# Patient Record
Sex: FEMALE | Race: WHITE | NOT HISPANIC OR LATINO | Employment: STUDENT | URBAN - METROPOLITAN AREA
[De-identification: names, ages, dates, MRNs, and addresses within clinical notes are randomized per-mention and may not be internally consistent; named-entity substitution may affect disease eponyms.]

---

## 2018-05-17 ENCOUNTER — RECORDS - HEALTHEAST (OUTPATIENT)
Dept: LAB | Facility: CLINIC | Age: 18
End: 2018-05-17

## 2018-05-18 LAB
25(OH)D3 SERPL-MCNC: 48.3 NG/ML (ref 30–80)
C TRACH DNA SPEC QL PROBE+SIG AMP: NEGATIVE
N GONORRHOEA DNA SPEC QL NAA+PROBE: NEGATIVE

## 2019-04-01 ENCOUNTER — TRANSFERRED RECORDS (OUTPATIENT)
Dept: HEALTH INFORMATION MANAGEMENT | Facility: CLINIC | Age: 19
End: 2019-04-01

## 2019-04-02 ENCOUNTER — TELEPHONE (OUTPATIENT)
Dept: ONCOLOGY | Facility: CLINIC | Age: 19
End: 2019-04-02

## 2019-04-02 DIAGNOSIS — D69.6 THROMBOCYTOPENIA (H): ICD-10-CM

## 2019-04-02 DIAGNOSIS — R23.3 EASY BRUISING: ICD-10-CM

## 2019-04-02 NOTE — TELEPHONE ENCOUNTER
ONCOLOGY INTAKE: Records Information      APPT INFORMATION: 5/1/19 at 5:00PM  Referring provider:  Dr. Franny Beaver  Referring provider s clinic:  [facility]  Reason for visit/diagnosis:  Low Platelets, Abnormal White Cell Count    RECORDS INFORMATION:  Were the records received with the referral (via [employer])? No    Has patient been seen for any external appt for this diagnosis? Yes    If yes, where? Only at [facility]    Has patient had any imaging or procedures outside of [facility] for this condition? No    ADDITIONAL INFORMATION:  4/2 - ARTUR for Dr. Beaver's clinic emailed to the pt. She will print, sign/date, and either email or fax back to oncology intake.

## 2019-04-02 NOTE — TELEPHONE ENCOUNTER
4/2 - Signed ARTUR received from pt for [facility] clinic. This has been saved to the R drive.

## 2019-04-05 ENCOUNTER — TRANSFERRED RECORDS (OUTPATIENT)
Dept: HEALTH INFORMATION MANAGEMENT | Facility: CLINIC | Age: 19
End: 2019-04-05

## 2019-04-08 ENCOUNTER — RECORDS - HEALTHEAST (OUTPATIENT)
Dept: LAB | Facility: CLINIC | Age: 19
End: 2019-04-08

## 2019-04-08 LAB — C REACTIVE PROTEIN LHE: 0.7 MG/DL (ref 0–0.8)

## 2019-04-10 ENCOUNTER — MEDICAL CORRESPONDENCE (OUTPATIENT)
Dept: HEALTH INFORMATION MANAGEMENT | Facility: CLINIC | Age: 19
End: 2019-04-10

## 2019-04-10 NOTE — TELEPHONE ENCOUNTER
RECORDS STATUS - ALL OTHER DIAGNOSIS      RECORDS RECEIVED FROM: [facility]   DATE RECEIVED: 4/26/19   NOTES STATUS DETAILS   OFFICE NOTE from referring provider Tangela Beaver   OFFICE NOTE from medical oncologist     DISCHARGE SUMMARY from hospital     DISCHARGE REPORT from the ER     OPERATIVE REPORT     MEDICATION LIST     CLINICAL TRIAL TREATMENTS TO DATE     LABS     PATHOLOGY REPORTS     ANYTHING RELATED TO DIAGNOSIS 4/1/19 Epic   GENONOMIC TESTING     TYPE:     IMAGING (NEED IMAGES & REPORT)     CT SCANS     MRI     MAMMO     ULTRASOUND     PET

## 2019-05-01 ENCOUNTER — PRE VISIT (OUTPATIENT)
Dept: ONCOLOGY | Facility: CLINIC | Age: 19
End: 2019-05-01

## 2019-05-01 ENCOUNTER — ONCOLOGY VISIT (OUTPATIENT)
Dept: ONCOLOGY | Facility: CLINIC | Age: 19
End: 2019-05-01
Attending: INTERNAL MEDICINE
Payer: COMMERCIAL

## 2019-05-01 VITALS
HEART RATE: 78 BPM | HEIGHT: 65 IN | TEMPERATURE: 97.9 F | WEIGHT: 149.5 LBS | SYSTOLIC BLOOD PRESSURE: 110 MMHG | OXYGEN SATURATION: 97 % | DIASTOLIC BLOOD PRESSURE: 68 MMHG | BODY MASS INDEX: 24.91 KG/M2

## 2019-05-01 DIAGNOSIS — D69.6 THROMBOCYTOPENIA (H): Primary | ICD-10-CM

## 2019-05-01 DIAGNOSIS — R79.0 LOW FERRITIN: ICD-10-CM

## 2019-05-01 DIAGNOSIS — D69.6 THROMBOCYTOPENIA (H): ICD-10-CM

## 2019-05-01 DIAGNOSIS — R23.3 EASY BRUISING: ICD-10-CM

## 2019-05-01 LAB
ABO + RH BLD: NORMAL
ABO + RH BLD: NORMAL
ALBUMIN SERPL-MCNC: 3.9 G/DL (ref 3.4–5)
ALP SERPL-CCNC: 63 U/L (ref 40–150)
ALT SERPL W P-5'-P-CCNC: 20 U/L (ref 0–50)
ANION GAP SERPL CALCULATED.3IONS-SCNC: 8 MMOL/L (ref 3–14)
APTT PPP: 29 SEC (ref 22–37)
AST SERPL W P-5'-P-CCNC: 23 U/L (ref 0–45)
BASOPHILS # BLD AUTO: 0.1 10E9/L (ref 0–0.2)
BASOPHILS NFR BLD AUTO: 1.1 %
BILIRUB SERPL-MCNC: 0.5 MG/DL (ref 0.2–1.3)
BUN SERPL-MCNC: 6 MG/DL (ref 7–30)
CALCIUM SERPL-MCNC: 9.1 MG/DL (ref 8.5–10.1)
CHLORIDE SERPL-SCNC: 107 MMOL/L (ref 94–109)
CO2 SERPL-SCNC: 24 MMOL/L (ref 20–32)
CREAT SERPL-MCNC: 0.61 MG/DL (ref 0.52–1.04)
DIFFERENTIAL METHOD BLD: ABNORMAL
EOSINOPHIL # BLD AUTO: 0.4 10E9/L (ref 0–0.7)
EOSINOPHIL NFR BLD AUTO: 5.8 %
ERYTHROCYTE [DISTWIDTH] IN BLOOD BY AUTOMATED COUNT: 12.2 % (ref 10–15)
ERYTHROCYTE [SEDIMENTATION RATE] IN BLOOD BY WESTERGREN METHOD: 7 MM/H (ref 0–20)
FERRITIN SERPL-MCNC: 9 NG/ML (ref 12–150)
GFR SERPL CREATININE-BSD FRML MDRD: >90 ML/MIN/{1.73_M2}
GLUCOSE SERPL-MCNC: 85 MG/DL (ref 70–99)
HCT VFR BLD AUTO: 39.7 % (ref 35–47)
HGB BLD-MCNC: 13.1 G/DL (ref 11.7–15.7)
IMM GRANULOCYTES # BLD: 0 10E9/L (ref 0–0.4)
IMM GRANULOCYTES NFR BLD: 0.2 %
INR PPP: 0.97 (ref 0.86–1.14)
IRON SATN MFR SERPL: 6 % (ref 15–46)
IRON SERPL-MCNC: 32 UG/DL (ref 35–180)
LDH SERPL L TO P-CCNC: 204 U/L (ref 81–234)
LYMPHOCYTES # BLD AUTO: 3 10E9/L (ref 0.8–5.3)
LYMPHOCYTES NFR BLD AUTO: 48.3 %
MCH RBC QN AUTO: 29.6 PG (ref 26.5–33)
MCHC RBC AUTO-ENTMCNC: 33 G/DL (ref 31.5–36.5)
MCV RBC AUTO: 90 FL (ref 78–100)
MONOCYTES # BLD AUTO: 0.4 10E9/L (ref 0–1.3)
MONOCYTES NFR BLD AUTO: 7.1 %
NEUTROPHILS # BLD AUTO: 2.3 10E9/L (ref 1.6–8.3)
NEUTROPHILS NFR BLD AUTO: 37.5 %
NRBC # BLD AUTO: 0 10*3/UL
NRBC BLD AUTO-RTO: 0 /100
PLATELET # BLD AUTO: 120 10E9/L (ref 150–450)
POTASSIUM SERPL-SCNC: 3.6 MMOL/L (ref 3.4–5.3)
PROT SERPL-MCNC: 8 G/DL (ref 6.8–8.8)
RBC # BLD AUTO: 4.42 10E12/L (ref 3.8–5.2)
RETICS # AUTO: 66.7 10E9/L (ref 25–95)
RETICS/RBC NFR AUTO: 1.5 % (ref 0.5–2)
SODIUM SERPL-SCNC: 138 MMOL/L (ref 133–144)
SPECIMEN EXP DATE BLD: NORMAL
TIBC SERPL-MCNC: 506 UG/DL (ref 240–430)
WBC # BLD AUTO: 6.2 10E9/L (ref 4–11)

## 2019-05-01 PROCEDURE — 86704 HEP B CORE ANTIBODY TOTAL: CPT | Performed by: INTERNAL MEDICINE

## 2019-05-01 PROCEDURE — 86803 HEPATITIS C AB TEST: CPT | Performed by: INTERNAL MEDICINE

## 2019-05-01 PROCEDURE — 86900 BLOOD TYPING SEROLOGIC ABO: CPT | Performed by: INTERNAL MEDICINE

## 2019-05-01 PROCEDURE — 86901 BLOOD TYPING SEROLOGIC RH(D): CPT | Performed by: INTERNAL MEDICINE

## 2019-05-01 PROCEDURE — 85240 CLOT FACTOR VIII AHG 1 STAGE: CPT | Performed by: INTERNAL MEDICINE

## 2019-05-01 PROCEDURE — 85730 THROMBOPLASTIN TIME PARTIAL: CPT | Performed by: INTERNAL MEDICINE

## 2019-05-01 PROCEDURE — 82784 ASSAY IGA/IGD/IGG/IGM EACH: CPT | Performed by: INTERNAL MEDICINE

## 2019-05-01 PROCEDURE — 86431 RHEUMATOID FACTOR QUANT: CPT | Performed by: INTERNAL MEDICINE

## 2019-05-01 PROCEDURE — 85245 CLOT FACTOR VIII VW RISTOCTN: CPT | Performed by: INTERNAL MEDICINE

## 2019-05-01 PROCEDURE — 86146 BETA-2 GLYCOPROTEIN ANTIBODY: CPT | Performed by: INTERNAL MEDICINE

## 2019-05-01 PROCEDURE — 86644 CMV ANTIBODY: CPT | Performed by: INTERNAL MEDICINE

## 2019-05-01 PROCEDURE — 85025 COMPLETE CBC W/AUTO DIFF WBC: CPT | Performed by: INTERNAL MEDICINE

## 2019-05-01 PROCEDURE — 99214 OFFICE O/P EST MOD 30 MIN: CPT | Mod: ZP | Performed by: INTERNAL MEDICINE

## 2019-05-01 PROCEDURE — 80053 COMPREHEN METABOLIC PANEL: CPT | Performed by: INTERNAL MEDICINE

## 2019-05-01 PROCEDURE — 36415 COLL VENOUS BLD VENIPUNCTURE: CPT | Performed by: INTERNAL MEDICINE

## 2019-05-01 PROCEDURE — 86747 PARVOVIRUS ANTIBODY: CPT | Mod: 91 | Performed by: INTERNAL MEDICINE

## 2019-05-01 PROCEDURE — 83876 ASSAY MYELOPEROXIDASE: CPT | Performed by: INTERNAL MEDICINE

## 2019-05-01 PROCEDURE — G0463 HOSPITAL OUTPT CLINIC VISIT: HCPCS | Mod: ZF

## 2019-05-01 PROCEDURE — 87340 HEPATITIS B SURFACE AG IA: CPT | Performed by: INTERNAL MEDICINE

## 2019-05-01 PROCEDURE — 83540 ASSAY OF IRON: CPT | Performed by: INTERNAL MEDICINE

## 2019-05-01 PROCEDURE — 83615 LACTATE (LD) (LDH) ENZYME: CPT | Performed by: INTERNAL MEDICINE

## 2019-05-01 PROCEDURE — 85247 CLOT FACTOR VIII MULTIMETRIC: CPT | Performed by: INTERNAL MEDICINE

## 2019-05-01 PROCEDURE — 85045 AUTOMATED RETICULOCYTE COUNT: CPT | Performed by: INTERNAL MEDICINE

## 2019-05-01 PROCEDURE — 87389 HIV-1 AG W/HIV-1&-2 AB AG IA: CPT | Performed by: INTERNAL MEDICINE

## 2019-05-01 PROCEDURE — 40000611 ZZHCL STATISTIC MORPHOLOGY W/INTERP HEMEPATH TC 85060: Performed by: INTERNAL MEDICINE

## 2019-05-01 PROCEDURE — 85610 PROTHROMBIN TIME: CPT | Performed by: INTERNAL MEDICINE

## 2019-05-01 PROCEDURE — 83516 IMMUNOASSAY NONANTIBODY: CPT | Performed by: INTERNAL MEDICINE

## 2019-05-01 PROCEDURE — 86747 PARVOVIRUS ANTIBODY: CPT | Performed by: INTERNAL MEDICINE

## 2019-05-01 PROCEDURE — 82728 ASSAY OF FERRITIN: CPT | Performed by: INTERNAL MEDICINE

## 2019-05-01 PROCEDURE — 00000401 ZZHCL STATISTIC THROMBIN TIME NC: Performed by: INTERNAL MEDICINE

## 2019-05-01 PROCEDURE — 85652 RBC SED RATE AUTOMATED: CPT | Performed by: INTERNAL MEDICINE

## 2019-05-01 PROCEDURE — 87799 DETECT AGENT NOS DNA QUANT: CPT | Performed by: INTERNAL MEDICINE

## 2019-05-01 PROCEDURE — 86147 CARDIOLIPIN ANTIBODY EA IG: CPT | Performed by: INTERNAL MEDICINE

## 2019-05-01 PROCEDURE — 85576 BLOOD PLATELET AGGREGATION: CPT | Performed by: INTERNAL MEDICINE

## 2019-05-01 PROCEDURE — 86706 HEP B SURFACE ANTIBODY: CPT | Performed by: INTERNAL MEDICINE

## 2019-05-01 PROCEDURE — 83550 IRON BINDING TEST: CPT | Performed by: INTERNAL MEDICINE

## 2019-05-01 PROCEDURE — 00000447 ZZHCL STATISTIC VON WILLEBRAND MULTIMERS: Performed by: INTERNAL MEDICINE

## 2019-05-01 PROCEDURE — 85613 RUSSELL VIPER VENOM DILUTED: CPT | Performed by: INTERNAL MEDICINE

## 2019-05-01 PROCEDURE — 83520 IMMUNOASSAY QUANT NOS NONAB: CPT | Performed by: INTERNAL MEDICINE

## 2019-05-01 PROCEDURE — 85246 CLOT FACTOR VIII VW ANTIGEN: CPT | Performed by: INTERNAL MEDICINE

## 2019-05-01 RX ORDER — DROSPIRENONE AND ETHINYL ESTRADIOL 0.02-3(28)
1 KIT ORAL DAILY
Refills: 1 | COMMUNITY
Start: 2019-04-06 | End: 2020-09-23

## 2019-05-01 RX ORDER — CETIRIZINE HYDROCHLORIDE 10 MG/1
10 TABLET ORAL 2 TIMES DAILY
COMMUNITY
End: 2020-06-04

## 2019-05-01 RX ORDER — ESCITALOPRAM OXALATE 10 MG/1
10 TABLET ORAL DAILY
Refills: 1 | COMMUNITY
Start: 2019-01-10 | End: 2020-06-04

## 2019-05-01 ASSESSMENT — PAIN SCALES - GENERAL: PAINLEVEL: NO PAIN (0)

## 2019-05-01 ASSESSMENT — MIFFLIN-ST. JEOR: SCORE: 1447.75

## 2019-05-01 NOTE — PROGRESS NOTES
[facility] Hematology Consultation    Outpatient Visit Note:    Patient: Telma Freed  MRN: [id number]  : 2000  FAUZIA: May 1, 2019    Reason for Consultation:  Telma Freed is a referred by Franny Beaver MD for evaluation and treatment of thrombocytopenia.    Assessment:  In summary, Telma Freed is a 18 year old woman with no significant past medical history and recent diagnosis of mononucleosis who presents with thrombocytopenia since  and personal history of easy bruising.     1). Chronic thrombocytopenia  2). Easy bruising  3). Recent mononucleosis  4). Low serum ferritin    Ms. Freed recently had mononucleosis which lead to platelet levels <40K. It is interesting that when she was on steroids this value did not increase. She has a personal history of easy bruising and had an ISTH bleeding assessment score of 5 in clinic today (Clara BUSTOS et al. 2014 JTH), in female patients a score of <6 adult female is considered normal. The biggest caveat is that this score is weighed toward VWD not platelet disorders AND patient has not gone through extreme stressors (childbirth, major surgery). Her mother also has history of easy bruising and heavy menstrual bleeding- raising concern for an inheirted disorder. Therefore, I will send off VWF screening. Her PFA screen was negative. Therefore, I will wait on ordering platelet aggregometry.     Her peripheral blood has normal platelet morphology, making macrothrombocytopenia less likely.         Plan:  1. Majority of today's visit was spent counseling the patient regarding thrombocytopenia.  2.   Orders Placed This Encounter   Procedures     Erythrocyte sedimentation rate auto     Ferritin     INR     Iron and iron binding capacity     Lactate Dehydrogenase     CBC with platelets differential     Factor 8 assay     Von Willebrand antigen     VWF Activity with reflex to Ristocetin Cofactor Activity     Von Willebrand  Multimers     von Willebrand Interpretation     Blood Morphology Pathologist Review     Reticulocyte Count     Rheumatoid factor     CMV Antibody IgG     Parvovirus B19 antibodies IgG IgM     Comprehensive metabolic panel     Tryptase     Ristocetin cofactor     Beta 2 Glycoprotein 1 Antibody IgG     Beta 2 Glycoprotein 1 Antibody IgM     Cardiolipin Kassie IgG and IgM     Lupus Anticoagulant Panel     Partial thromboplastin time     EBV DNA PCR Quantitative Whole Blood     Vasculitis panel     HIV Antigen Antibody Combo     Immunoglobulins A G and M     Hepatitis C antibody     Hepatitis B core antibody     Hepatitis B Surface Antibody     Hepatitis B surface antigen     Platelet function closure with reflex     ABO and Rh         The patient is given our center's contact information and is instructed to call if she should have any further questions or concerns.  Otherwise, we will plan on seeing her back Follow up with Provider - 3 months .        Yin Chin MD/PhD   of Medicine  [facility]   ----------------------------------------------------------------------------------------------------------------------    History of Present Illness:  Telma Freed is a 18 year old woman who presents in consultation due to chronic thrombocytopenia (platelets ~120) with recent worsening due to mononucleosis and lifelong history of easy bruising.     Telma reports that she is a easy bruiser her whole life. She report having large bruises on her arms with minor or no trauma. About 1-2 a year she get large, deep bruises after trauma. She is currently on oral contraceptives. She reports that these have decreased her periods from 7 days to 5 days. She has to change a super tampon every 2 hours due to it being soaked. She also has to double up wearing a pad and tampon at night. She also endorses having nose bleeds several times a month. They last 5-10 min. She has never  "been to ER for nosebleeds. She has had teeth removed and her adenoids as a young teen. She does not recall having severe bleeding during these surgeries.     Telma denies craving ice. She does not eat red meat, but otherwise eats other animal protein. She has not noted gum bleeding. She has not had any episodes of hemoptysis, but does report a \"copper taste\" in the back of her throat from time to time. She recent had mono. This was diagnosed after she had severe sore throat. She was placed on steroids that did not help. She eventually was placed on antibiotics. During this time her platelet count fell as low as 38.      Telma reports that she currently has hives over her arms and legs. This started after lying outside in grass. She also endorses history of flushing after running. She denies having fevers, night sweats or chills.     Past Medical History:  Larry barr mono    Past Surgical History:  Adenoids    Medications:  Current Outpatient Medications   Medication Sig Dispense Refill     cetirizine (ZYRTEC ALLERGY) 10 MG tablet Take 10 mg by mouth 2 times daily       diphenhydrAMINE (BENADRYL) 2 % external cream Apply topically 3 times daily as needed for itching       diphenhydrAMINE HCl (BENADRYL ALLERGY PO)        NEW MED           Allergies:  Allergies   Allergen Reactions     Seasonal Allergies Hives       ROS:  A 14 point ROS is negative except as stated in the HPI    Social History:  Denies any tobacco use. Drinks alcohol on weekends. Abstained during her acute illness with mono. Denies any illicit drug use. 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    Family History:  Mother with easy bruising, heavy bleeding   Younger brother with no bleeding history per patient  Maternal grandmother  Maternal grandfather  Maternal aunt    Patient is not aware of her father's family history  Objective:  Vitals: /68 (BP Location: Right arm, Patient Position: " "Sitting, Cuff Size: Adult Regular)   Pulse 78   Temp 97.9  F (36.6  C) (Oral)   Ht 1.649 m (5' 4.92\")   Wt 67.8 kg (149 lb 8 oz)   SpO2 97%   BMI 24.94 kg/m    Exam:   Constitutional: Appears well, no distress  HEENT: Pupils equal and reactive to light. No scleral icterus or hemorrhage. Nares without evidence of telangiectasia. Mucous membranes moist with no wet purpura. Dentition overall ok with no signs of decay. No pharyngeal exudates. No lymphadenopathy, no thyromeagaly  CV: regular rate and rhythm, no murmurs  Respiratory: clear  GI: abdomen soft, nontender, without guarding or rebound. No hepatomeagaly. No splenomegaly. Rectal exam deferred.   Mus/Skele: no edema  Skin: no petechiae, no ecchymosis.bilateral arms with small (~2 mm) punctate raised lesions on extensor surface. + dermatographia.  Neuro: CN II-XII intact. Normal gait. AOx3  Heme/Lymph: no supraclavicular, axillary or umbilical adenopathy.     Labs: personally reviewed with relevant trends annotated below  Results for orders placed or performed in visit on 05/01/19 (from the past 48 hour(s))   INR   Result Value Ref Range    INR 0.97 0.86 - 1.14   CMV Antibody IgG   Result Value Ref Range    CMV Antibody IgG <0.2 0.0 - 0.8 AI   Comprehensive metabolic panel   Result Value Ref Range    Sodium 138 133 - 144 mmol/L    Potassium 3.6 3.4 - 5.3 mmol/L    Chloride 107 94 - 109 mmol/L    Carbon Dioxide 24 20 - 32 mmol/L    Anion Gap 8 3 - 14 mmol/L    Glucose 85 70 - 99 mg/dL    Urea Nitrogen 6 (L) 7 - 30 mg/dL    Creatinine 0.61 0.52 - 1.04 mg/dL    GFR Estimate >90 >60 mL/min/[1.73_m2]    GFR Estimate If Black >90 >60 mL/min/[1.73_m2]    Calcium 9.1 8.5 - 10.1 mg/dL    Bilirubin Total 0.5 0.2 - 1.3 mg/dL    Albumin 3.9 3.4 - 5.0 g/dL    Protein Total 8.0 6.8 - 8.8 g/dL    Alkaline Phosphatase 63 40 - 150 U/L    ALT 20 0 - 50 U/L    AST 23 0 - 45 U/L   ABO and Rh   Result Value Ref Range    ABO A     RH(D) Neg     Specimen Expires 05/04/2019  "   Cardiolipin Kassie IgG and IgM   Result Value Ref Range    Cardiolipin Antibody IgG <1.6 0.0 - 19.9 GPL-U/mL    Cardiolipin Antibody IgM 0.5 0.0 - 19.9 MPL-U/mL   Partial thromboplastin time   Result Value Ref Range    PTT 29 22 - 37 sec       Imaging:  none

## 2019-05-01 NOTE — PATIENT INSTRUCTIONS
We are checking a number of labs to check for low platelet counts.   This includes evaluation for von Willebrand Disease, infections, and autoimmune conditions.     I will contact you through [id number] as you may need further testing.     Follow up in late July or August before you start school.        Yin Chin MD/PhD   of Medicine  Division of Hematology, Oncology and Transplantation    [facility]  [address] [place] [address], Mail [id number]  [place] [address]    Clinic [phone]  Nurse Line: [phone]    RN Care Coordinator: Paula

## 2019-05-01 NOTE — NURSING NOTE
"Oncology Rooming Note    May 1, 2019 4:52 PM   Telma Freed is a 18 year old female who presents for:    Chief Complaint   Patient presents with     Consult     New Eval- Complete Low platelet count, abnormal white cell count     Initial Vitals: /68 (BP Location: Right arm, Patient Position: Sitting, Cuff Size: Adult Regular)   Pulse 78   Temp 97.9  F (36.6  C) (Oral)   Ht 1.649 m (5' 4.92\")   Wt 67.8 kg (149 lb 8 oz)   SpO2 97%   BMI 24.94 kg/m   Estimated body mass index is 24.94 kg/m  as calculated from the following:    Height as of this encounter: 1.649 m (5' 4.92\").    Weight as of this encounter: 67.8 kg (149 lb 8 oz). Body surface area is 1.76 meters squared.  No Pain (0) Comment: Data Unavailable   No LMP recorded.  Allergies reviewed: Yes  Medications reviewed: Yes    Medications: Medication refills not needed today.  Pharmacy name entered into EPIC: Data Unavailable    Clinical concerns:  none    Shanna Aguilar CMA              "

## 2019-05-01 NOTE — LETTER
2019       RE: Telma Freed   [facility] [address]     Dear Colleague,    Thank you for referring your patient, Telma Freed, to the [facility]. Please see a copy of my visit note below.        [facility] Hematology Consultation    Outpatient Visit Note:    Patient: Telma Freed  MRN: [id number]  : 2000  FAUZIA: May 1, 2019    Reason for Consultation:  Telma Freed is a referred by Franny Beaver MD for evaluation and treatment of thrombocytopenia.    Assessment:  In summary, Telma Freed is a 18 year old woman with no significant past medical history and recent diagnosis of mononucleosis who presents with thrombocytopenia since  and personal history of easy bruising.     1). Chronic thrombocytopenia  2). Easy bruising  3). Recent mononucleosis  4). Low serum ferritin    Ms. Freed recently had mononucleosis which lead to platelet levels <40K. It is interesting that when she was on steroids this value did not increase. She has a personal history of easy bruising and had an ISTH bleeding assessment score of 5 in clinic today (Clara BUSTOS et al. 2014 JTH), in female patients a score of <6 adult female is considered normal. The biggest caveat is that this score is weighed toward VWD not platelet disorders AND patient has not gone through extreme stressors (childbirth, major surgery). Her mother also has history of easy bruising and heavy menstrual bleeding- raising concern for an inheirted disorder. Therefore, I will send off VWF screening. Her PFA screen was negative. Therefore, I will wait on ordering platelet aggregometry.     Her peripheral blood has normal platelet morphology, making macrothrombocytopenia less likely.         Plan:  1. Majority of today's visit was spent counseling the patient regarding thrombocytopenia.  2.   Orders Placed This Encounter   Procedures     Erythrocyte sedimentation rate auto      Ferritin     INR     Iron and iron binding capacity     Lactate Dehydrogenase     CBC with platelets differential     Factor 8 assay     Von Willebrand antigen     VWF Activity with reflex to Ristocetin Cofactor Activity     Von Willebrand Multimers     von Willebrand Interpretation     Blood Morphology Pathologist Review     Reticulocyte Count     Rheumatoid factor     CMV Antibody IgG     Parvovirus B19 antibodies IgG IgM     Comprehensive metabolic panel     Tryptase     Ristocetin cofactor     Beta 2 Glycoprotein 1 Antibody IgG     Beta 2 Glycoprotein 1 Antibody IgM     Cardiolipin Kassie IgG and IgM     Lupus Anticoagulant Panel     Partial thromboplastin time     EBV DNA PCR Quantitative Whole Blood     Vasculitis panel     HIV Antigen Antibody Combo     Immunoglobulins A G and M     Hepatitis C antibody     Hepatitis B core antibody     Hepatitis B Surface Antibody     Hepatitis B surface antigen     Platelet function closure with reflex     ABO and Rh         The patient is given our center's contact information and is instructed to call if she should have any further questions or concerns.  Otherwise, we will plan on seeing her back Follow up with Provider - 3 months .        Yin Chin MD/PhD   of Medicine  [facility] [employer]   ----------------------------------------------------------------------------------------------------------------------    History of Present Illness:  Telma Freed is a 18 year old woman who presents in consultation due to chronic thrombocytopenia (platelets ~120) with recent worsening due to mononucleosis and lifelong history of easy bruising.     Telma reports that she is a easy bruiser her whole life. She report having large bruises on her arms with minor or no trauma. About 1-2 a year she get large, deep bruises after trauma. She is currently on oral contraceptives. She reports that these have decreased her periods from 7 days  "to 5 days. She has to change a super tampon every 2 hours due to it being soaked. She also has to double up wearing a pad and tampon at night. She also endorses having nose bleeds several times a month. They last 5-10 min. She has never been to ER for nosebleeds. She has had teeth removed and her adenoids as a young teen. She does not recall having severe bleeding during these surgeries.     Telma denies craving ice. She does not eat red meat, but otherwise eats other animal protein. She has not noted gum bleeding. She has not had any episodes of hemoptysis, but does report a \"copper taste\" in the back of her throat from time to time. She recent had mono. This was diagnosed after she had severe sore throat. She was placed on steroids that did not help. She eventually was placed on antibiotics. During this time her platelet count fell as low as 38.      Telma reports that she currently has hives over her arms and legs. This started after lying outside in grass. She also endorses history of flushing after running. She denies having fevers, night sweats or chills.     Past Medical History:  Larry barr mono    Past Surgical History:  Adenoids    Medications:  Current Outpatient Medications   Medication Sig Dispense Refill     cetirizine (ZYRTEC ALLERGY) 10 MG tablet Take 10 mg by mouth 2 times daily       diphenhydrAMINE (BENADRYL) 2 % external cream Apply topically 3 times daily as needed for itching       diphenhydrAMINE HCl (BENADRYL ALLERGY PO)        NEW MED           Allergies:  Allergies   Allergen Reactions     Seasonal Allergies Hives       ROS:  A 14 point ROS is negative except as stated in the HPI    Social History:  Denies any tobacco use. Drinks alcohol on weekends. Abstained during her acute illness with mono. Denies any illicit drug use. 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    Family History:  Mother with easy bruising, heavy bleeding   Younger " "brother with no bleeding history per patient  Maternal grandmother  Maternal grandfather  Maternal aunt    Patient is not aware of her father's family history  Objective:  Vitals: /68 (BP Location: Right arm, Patient Position: Sitting, Cuff Size: Adult Regular)   Pulse 78   Temp 97.9  F (36.6  C) (Oral)   Ht 1.649 m (5' 4.92\")   Wt 67.8 kg (149 lb 8 oz)   SpO2 97%   BMI 24.94 kg/m     Exam:   Constitutional: Appears well, no distress  HEENT: Pupils equal and reactive to light. No scleral icterus or hemorrhage. Nares without evidence of telangiectasia. Mucous membranes moist with no wet purpura. Dentition overall ok with no signs of decay. No pharyngeal exudates. No lymphadenopathy, no thyromeagaly  CV: regular rate and rhythm, no murmurs  Respiratory: clear  GI: abdomen soft, nontender, without guarding or rebound. No hepatomeagaly. No splenomegaly. Rectal exam deferred.   Mus/Skele: no edema  Skin: no petechiae, no ecchymosis.bilateral arms with small (~2 mm) punctate raised lesions on extensor surface. + dermatographia.  Neuro: CN II-XII intact. Normal gait. AOx3  Heme/Lymph: no supraclavicular, axillary or umbilical adenopathy.     Labs: personally reviewed with relevant trends annotated below  Results for orders placed or performed in visit on 05/01/19 (from the past 48 hour(s))   INR   Result Value Ref Range    INR 0.97 0.86 - 1.14   CMV Antibody IgG   Result Value Ref Range    CMV Antibody IgG <0.2 0.0 - 0.8 AI   Comprehensive metabolic panel   Result Value Ref Range    Sodium 138 133 - 144 mmol/L    Potassium 3.6 3.4 - 5.3 mmol/L    Chloride 107 94 - 109 mmol/L    Carbon Dioxide 24 20 - 32 mmol/L    Anion Gap 8 3 - 14 mmol/L    Glucose 85 70 - 99 mg/dL    Urea Nitrogen 6 (L) 7 - 30 mg/dL    Creatinine 0.61 0.52 - 1.04 mg/dL    GFR Estimate >90 >60 mL/min/[1.73_m2]    GFR Estimate If Black >90 >60 mL/min/[1.73_m2]    Calcium 9.1 8.5 - 10.1 mg/dL    Bilirubin Total 0.5 0.2 - 1.3 mg/dL    Albumin 3.9 " 3.4 - 5.0 g/dL    Protein Total 8.0 6.8 - 8.8 g/dL    Alkaline Phosphatase 63 40 - 150 U/L    ALT 20 0 - 50 U/L    AST 23 0 - 45 U/L   ABO and Rh   Result Value Ref Range    ABO A     RH(D) Neg     Specimen Expires 05/04/2019    Cardiolipin Kassie IgG and IgM   Result Value Ref Range    Cardiolipin Antibody IgG <1.6 0.0 - 19.9 GPL-U/mL    Cardiolipin Antibody IgM 0.5 0.0 - 19.9 MPL-U/mL   Partial thromboplastin time   Result Value Ref Range    PTT 29 22 - 37 sec       Imaging:  none        Again, thank you for allowing me to participate in the care of your patient.      Sincerely,    Yin Chin MD

## 2019-05-02 DIAGNOSIS — D69.6 THROMBOCYTOPENIA (H): ICD-10-CM

## 2019-05-02 LAB
CARDIOLIPIN ANTIBODY IGG: <1.6 GPL-U/ML (ref 0–19.9)
CARDIOLIPIN ANTIBODY IGM: 0.5 MPL-U/ML (ref 0–19.9)
CLOSURE TME COLL+EPINEP BLD: 114 SEC
CMV IGG SERPL QL IA: <0.2 AI (ref 0–0.8)
COPATH REPORT: NORMAL
HBV CORE AB SERPL QL IA: NONREACTIVE
HBV SURFACE AB SERPL IA-ACNC: 1 M[IU]/ML
HBV SURFACE AG SERPL QL IA: NONREACTIVE
HCV AB SERPL QL IA: NONREACTIVE
HIV 1+2 AB+HIV1 P24 AG SERPL QL IA: NONREACTIVE
IGA SERPL-MCNC: 286 MG/DL (ref 70–380)
IGG SERPL-MCNC: 1200 MG/DL (ref 695–1620)
IGM SERPL-MCNC: 131 MG/DL (ref 60–265)
MYELOPEROXIDASE AB SER-ACNC: <0.2 AI (ref 0–0.9)
PROTEINASE3 IGG SER-ACNC: <0.2 AI (ref 0–0.9)
RHEUMATOID FACT SER NEPH-ACNC: <20 IU/ML (ref 0–20)

## 2019-05-03 LAB
B19V IGG SER IA-ACNC: 6.46 IV
B19V IGM SER IA-ACNC: 0.28 IV
B2 GLYCOPROT1 IGG SERPL IA-ACNC: 1.1 U/ML
B2 GLYCOPROT1 IGM SERPL IA-ACNC: <0.9 U/ML
FACT VIII ACT/NOR PPP: 130 % (ref 55–200)
LA PPP-IMP: NEGATIVE
TRYPTASE SERPL-MCNC: 3.6 UG/L
VWF CBA/VWF AG PPP IA-RTO: 124 % (ref 50–200)
VWF:AC ACT/NOR PPP IA: 122 % (ref 50–180)

## 2019-05-06 LAB
EBV DNA # SPEC NAA+PROBE: ABNORMAL {COPIES}/ML
EBV DNA SPEC NAA+PROBE-LOG#: 4.3 {LOG_COPIES}/ML
VWF MULTIMERS PPP QL: NORMAL
VWF:RCO ACT/NOR PPP PL AGG: NORMAL %

## 2019-05-08 LAB — VWF:RCO ACT/NOR PPP PL AGG: 79 % (ref 51–215)

## 2019-05-10 LAB — VWF MULTIMERS PPP QL: NORMAL

## 2019-05-13 LAB — VON WILLEBRAND INTERPRETATION: NORMAL

## 2019-05-16 DIAGNOSIS — D69.6 THROMBOCYTOPENIA (H): Primary | ICD-10-CM

## 2019-05-16 DIAGNOSIS — R23.3 EASY BRUISING: ICD-10-CM

## 2019-05-20 ENCOUNTER — TELEPHONE (OUTPATIENT)
Dept: ONCOLOGY | Facility: CLINIC | Age: 19
End: 2019-05-20

## 2019-05-20 NOTE — TELEPHONE ENCOUNTER
Dr. Chin requested Telma have platelet aggregometry testing completed. I have been working with Chyna from the [facility] to get her scheduled, however Telma was unaware of the purpose of the test and did not want to schedule the test. I called and spoke with Telma about why we wanted to do further testing, but she is still slightly hesitant. I offered to have Dr. Chin call her to explain the testing and I will call her insurance to make sure the test is covered. Telma was in agreement with this plan.

## 2019-06-26 ENCOUNTER — ALLIED HEALTH/NURSE VISIT (OUTPATIENT)
Dept: HEMATOLOGY | Facility: CLINIC | Age: 19
End: 2019-06-26
Payer: COMMERCIAL

## 2019-06-26 DIAGNOSIS — D69.9 BLEEDING DIATHESIS (H): Primary | ICD-10-CM

## 2019-06-26 PROCEDURE — 85576 BLOOD PLATELET AGGREGATION: CPT | Performed by: INTERNAL MEDICINE

## 2019-06-26 PROCEDURE — 00000167 ZZHCL STATISTIC INR NC: Performed by: INTERNAL MEDICINE

## 2019-06-26 PROCEDURE — 00000328 ZZHCL STATISTIC PTT NC: Performed by: INTERNAL MEDICINE

## 2019-06-26 PROCEDURE — 00000401 ZZHCL STATISTIC THROMBIN TIME NC: Performed by: INTERNAL MEDICINE

## 2019-06-26 PROCEDURE — 36415 COLL VENOUS BLD VENIPUNCTURE: CPT | Performed by: INTERNAL MEDICINE

## 2019-06-27 LAB
PA AA BLD-ACNC: NORMAL
PA ADP BLD-ACNC: NORMAL
PA COLL PRP QL: NORMAL
PA EPINEPH PRP QL: NORMAL
PA RIST PRP QL: NORMAL
PA THROMBIN PRP: NORMAL

## 2020-03-11 ENCOUNTER — HEALTH MAINTENANCE LETTER (OUTPATIENT)
Age: 20
End: 2020-03-11

## 2020-06-02 ENCOUNTER — PATIENT OUTREACH (OUTPATIENT)
Dept: ONCOLOGY | Facility: CLINIC | Age: 20
End: 2020-06-02

## 2020-06-02 NOTE — PROGRESS NOTES
Message received by scheduling team, request for appointment:    Appointment Request From: Telma Freed     With Provider: Yin Chin MD [[facility]]     Preferred Date Range: 6/1/2020 - 6/19/2020     Preferred Times: Any Time     Reason for visit: Request an Appointment     Comments:  Appointment needs to be online, broken blood vessel issues and recommendations.       Video appointment scheduled for 6/3/2020

## 2020-06-03 ENCOUNTER — VIRTUAL VISIT (OUTPATIENT)
Dept: ONCOLOGY | Facility: CLINIC | Age: 20
End: 2020-06-03
Attending: INTERNAL MEDICINE
Payer: COMMERCIAL

## 2020-06-03 ENCOUNTER — TELEPHONE (OUTPATIENT)
Dept: HEMATOLOGY | Facility: CLINIC | Age: 20
End: 2020-06-03

## 2020-06-03 DIAGNOSIS — D69.3 IDIOPATHIC THROMBOCYTOPENIA (H): Primary | ICD-10-CM

## 2020-06-03 DIAGNOSIS — R23.3 EASY BRUISING: ICD-10-CM

## 2020-06-03 PROCEDURE — 99214 OFFICE O/P EST MOD 30 MIN: CPT | Mod: 95 | Performed by: INTERNAL MEDICINE

## 2020-06-03 PROCEDURE — 40000114 ZZH STATISTIC NO CHARGE CLINIC VISIT

## 2020-06-03 NOTE — LETTER
"    6/3/2020         RE: Telma Freed  [address]  [place] [address]        Dear Colleague,    Thank you for referring your patient, Telma Freed, to the [facility]. Please see a copy of my visit note below.    Telma Freed is a 19 year old female who is being evaluated via a billable video visit.      The patient has been notified of following:     \"This video visit will be conducted via a call between you and your physician/provider. We have found that certain health care needs can be provided without the need for an in-person physical exam.  This service lets us provide the care you need with a video conversation.  If a prescription is necessary we can send it directly to your pharmacy.  If lab work is needed we can place an order for that and you can then stop by our lab to have the test done at a later time.    Video visits are billed at different rates depending on your insurance coverage.  Please reach out to your insurance provider with any questions.    If during the course of the call the physician/provider feels a video visit is not appropriate, you will not be charged for this service.\"    Patient has given verbal consent for Video visit? Yes    How would you like to obtain your AVS? MyChart    Patient would like the video invitation sent by: Send to e-mail at: [email or web address]    Will anyone else be joining your video visit? No        Video-Visit Details    Type of service:  Video Visit    Video Start Time: 3:38 PM  Video End Time: 4:03 PM    Originating Location (pt. Location): Home    Distant Location (provider location):  [facility]     Platform used for Video Visit: Ian Chin    [facility] Hematology Consultation     Outpatient Visit Note:     Patient: Telma Freed  MRN: [id number]  : 2000  Date of Initial Consult: May 1, 2019  Date of Video Visit: 6/3/20       Reason for Consultation:  " "Telma Freed is a referred by Franny Beaver MD for evaluation and treatment of thrombocytopenia.     Assessment:  In summary, Telma Freed is a 19 year old woman with no significant past medical history who presented in 2019 after she was diagnosed with mono and developed thrombocytopenia to platelet count of <40K. She was noted to have thrombocytopenia since 2014 and personal history of easy bruising.      1). Chronic thrombocytopenia  2). Easy bruising with normal platelet aggregation and VWF evaluation  3). Low serum ferritin     Ms. Freed presented for video evaluation. She is currently in [place]. During her initial evaluation in May 2019 she had a  personal history of easy bruising and had an ISTH bleeding assessment score of 5 in clinic today (Clara BUSTOS et al. 2014 J), in female patients a score of <6 adult female is considered normal. The biggest caveat is that this score is weighed toward VWD not platelet disorders AND patient has not gone through extreme stressors (childbirth, major surgery). Her mother also has history of easy bruising and heavy menstrual bleeding- raising concern for an inheirted disorder. Her evaluation, which included PFA, VWF screening and platelet aggregometry was normal. She continued to have platelet count of 120. Her peripheral blood has normal platelet morphology, making macrothrombocytopenia less likely    In last year, Telma has done well, except she has developed petechiae and ecchymosis on her lower body. This has been getting progressively worse. No recent CBCs.      At present, her diagnosis in likely ITP, as this is a diagnosis of exclusion. However, given her young age and her presentation, I would entertain evaluation with collagen binding. Patient does not have hyper-extensibility- but with potential \"running induced\" bruising could entertain collagen disorder such as William Danlos.     I have reached out to collegues in [place] to determine " if they can assist with getting patient a platelet count. She has not received treatment for ITP, so would start with dexamethasone 40 mg PO x4 days q3 weeks.     Telma is returning to [place] in August and will establish care in my [facility]        Plan:  1. Majority of today's visit was spent counseling the patient regarding thrombocytopenia.  2. Contact [facility] to obtain CBC   3. If platelets are <40,000 would consider empiric treatment for ITP        The patient is given our center's contact information and is instructed to call if she should have any further questions or concerns.  Otherwise, we will plan on seeing her back Follow up with Provider - AUGUST at [facility] (in-basket sent to schedulers there)          Yin Chin MD/PhD   of Medicine  [employer]   ______________________________________________________  Please refer to notes from Luz Reyes. Telma was able to get platelet count in [place] and her numbers are normal.     She would like to wait for late August appointment with me to do further diagnostic testing for bleeding disorder. Would include evaluation for FXIII, alpha-2 AP, etc. Would also include Sana Araujo for genetics.   Telma would like to try Lysteda.   Will send prescription to [facility] pharmacy and they can contact her on Monday to discuss shipping.     Yin Chin MD/PhD   of Medicine  Division of Hematology, Oncology and Transplantation  Pager [phone]  5:22 PM    ----------------------------------------------------------------------------------------------------------------------     Interval History:  Telma Freed is a 19 year old woman who first presented in May 2019 in consultation due to chronic thrombocytopenia (platelets ~120) with recent worsening due to mononucleosis and lifelong history of easy bruising. Please refer to  my initial consult note for further details.     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 She is transferring to the [employer] starting in the fall.     Recently Telma has noted petchiae all over her arms and can not get them to go away. Not sure if her platelet count is low. She will get them on her legs from running. Has been working out every day. On both sides of hips and up her low back. On chest and arms. They do not heal. WIll get dark and plum colored. They will fade and get back. Not sure what to do them wrong.     She does not eat red meat, but otherwise eats other animal protein. She has not noted gum bleeding. She denies having fevers, night sweats or chills.      Past Medical History:  EBV Mono     Past Surgical History:  Adenoids     Medications:  Current Outpatient Medications   Medication     GIANVI 3-0.02 MG tablet     No current facility-administered medications for this visit.            Allergies:       Allergies   Allergen Reactions     Seasonal Allergies Hives        ROS:  A 7 point ROS is negative except as stated in the HPI     Social History:  Denies any tobacco use. Drinks alcohol on weekends. Abstained during her acute illness with mono. Denies any illicit drug use. 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     Family History:  Mother with easy bruising, heavy bleeding   Younger brother with no bleeding history per patient  Maternal grandmother  Maternal grandfather  Maternal aunt     Patient is not aware of her father's family history    Objective:  GENERAL: Healthy, alert and no distress  EYES: Eyes grossly normal to inspection.  No discharge or erythema, or obvious scleral/conjunctival abnormalities.  RESP: No audible wheeze, cough, or visible cyanosis.  No visible retractions or increased work of breathing.    SKIN:  Visible skin clear. Patient stood up and demonstrated large patches of ecchymosis over bilateral hips and on extensor forearms. No other significant rash, abnormal pigmentation or lesions.  NEURO: Cranial nerves grossly intact.  Mentation and speech appropriate for age.  PSYCH: Mentation appears normal, affect normal/bright, judgement and insight intact, normal speech and appearance well-groomed.       Labs: personally reviewed with relevant trends annotated below    Bleeding evaluation  The von Willebrand factor antigen (VWF:Ag) was 124% (range %), von Willebrand factor activity (VWF:ACT) was 122% (range %), and Factor 8 levels are within normal limits (130%, range %). Ristocetin Cofactor Activity (VWF:RCo) is normal at 79% (range %). The Factor 8  to VWF:Ag ratio and the VWF:ACT to VWF:Ag ratio are within normal limits.     The  VWF:RCo to VWF:Ag ratio is decreased/borderline low normal.       The distribution of plasma von Willebrand factor multimers is normal (see report from [facility]).     In light of the presence of a  decreased/borderline low normal VWF:RCo  to VWF:Ag ratio, presence of  a Type 2 variant (e.g. Type 2M) and some allele variants not associated with bleeding (Flood et al. Blood 2010;116:280-6 and Flood et al. Blood 2013;121:3742-4) can not be excluded.       Platelet aggregometry  Arachadonic Acid  See table below     Comment: (Note)   COMMENTS:                       Normal platelet aggregation study.  The patient is noted to have mild   thrombocytopenia and the platelet count of the platelet rich plasma   (PRP) used for testing is mildly decreased. Platelet aggregation   reference ranges have been established for a platelet count of   250x10^9/L in the PRP.  Due to thrombocytopenia in the PRP,   aggregation tracings are interpreted based on ranges reported in   literature (Khai et al. Thromb Haemost 2008;100:134-145). Maximal   platelet aggregation is within  normal limits with ADP, Epinephrine,   Collagen, Arachidonic Acid, and low and high doses of Ristocetin.   ATP release is within normal limits with ADP and Thrombin. These   findings are non-diagnostic for a platelet function disorder. If a   platelet function disorder is suspected, consider repeat testing and   electron microscopy.                                       Brandee Medeiros M.D. [phone]                       6/26/2019                                   ATP RELEASE:             ATP Release with ADP:          Normal, 0.87 nm       ATP Release with Thrombin:     Normal, 1.32 nm       AGGREGATION:   Reagent  Concentration    Primary Agg         Maximal Agg   ADP      5 micromolar     Single large wave   Normal, 77%   EPI      10 micromolar    Present             Normal, 72%   COL      2.0 mcg/mL       -------             Normal, 90%   Ar. A.   0.50 mg/mL       -------             Normal, 71%   REFERENCE RANGES:   ATP release with TBN  Greater than or equal to 0.50 nm   ATP release with ADP  Greater than or equal to 0.40 nm   ADP % Agg             Greater than or equal to 64%   EPI % Agg             Greater than or equal to 63%   COL % Agg             Greater than or equal to 66%   AA  % Agg             Greater than or equal to 63%    Ristocetin-4 Conc  (Note)     Comment: COMMENTS:                       See full platelet aggregation comments.                       Brandee Medeiros M.D. [phone]                       6/26/2019                         RISTOCETIN:   Concentration    Primary Agg        Maximal Agg   0.50 mg/mL       Absent             Normal, 4%     1.00 mg/mL       Present            Normal, 83%   1.25 mg/mL       Single large wave  Normal, 91%   REFERENCE RANGES:   Ristocetin Conc.  % Aggregation   Risto 0.5 mg/mL   Less than or equal to 6%   Risto 1.0 mg/mL   Greater than or equal to 11%   Risto 1.25 mg/mL  Greater than or equal to 76%      Hepatitis B surface and core antibodies were  nonreactive  CMV IgG negative  HIV and Hepatitis C negative    EBV DNA was positive with 19,473 DNA copies/mL-- consistent with history of mono  Pargovirus B19 IgG positive, IgM negative      Again, thank you for allowing me to participate in the care of your patient.        Sincerely,        Yin Chin MD

## 2020-06-03 NOTE — TELEPHONE ENCOUNTER
Telma Freed is a patient seen by Dr. Chin previously at the [facility] (please see previous notes for bleed history) with chronic thrombocytopenia, easy bruising and low serum ferritin who will be transferring her care to the [facility].      Per Dr. Chin she had a video visit with Telma today and she is reporting large bruises, noted after running. RN called Telma to assess, who states she is worried her platelet count is low. She denies any headache, dizziness or fatigue. RN did let her know about the option to present to the emergency room if bleeding symptoms worsen.    Telma is currently located in [place], [place] for [place] and will be returning home in August. Per Dr. Chin we should try to obtain a CBC to check her platelets while out of state.     RN called the [facility] at [phone] to assess their ability to help with lab draw or to see the patient in person. RN was unable to speak with any staff member but left a detailed voicemail and a call back number for our Center.     RN then called Telma and let her know about lack of contact, and let her know we would continue trying and exploring other avenues if needed. She is fine with this plan and will wait to hear from us.     Radha Junior, MSN, RN, PHN - Nurse Clinician - [facility] - [phone]

## 2020-06-03 NOTE — PROGRESS NOTES
"Telma Freed is a 19 year old female who is being evaluated via a billable video visit.      The patient has been notified of following:     \"This video visit will be conducted via a call between you and your physician/provider. We have found that certain health care needs can be provided without the need for an in-person physical exam.  This service lets us provide the care you need with a video conversation.  If a prescription is necessary we can send it directly to your pharmacy.  If lab work is needed we can place an order for that and you can then stop by our lab to have the test done at a later time.    Video visits are billed at different rates depending on your insurance coverage.  Please reach out to your insurance provider with any questions.    If during the course of the call the physician/provider feels a video visit is not appropriate, you will not be charged for this service.\"    Patient has given verbal consent for Video visit? Yes    How would you like to obtain your AVS? Marian    Patient would like the video invitation sent by: Send to e-mail at: [email or web address]    Will anyone else be joining your video visit? No        Video-Visit Details    Type of service:  Video Visit    Video Start Time: 3:38 PM  Video End Time: 4:03 PM    Originating Location (pt. Location): Home    Distant Location (provider location):  [facility]     Platform used for Video Visit: Ian Chin    [facility] Hematology Consultation     Outpatient Visit Note:     Patient: Telma Freed  MRN: [id number]  : 2000  Date of Initial Consult: May 1, 2019  Date of Video Visit: 6/3/20       Reason for Consultation:  Telma Freed is a referred by Franny Beaver MD for evaluation and treatment of thrombocytopenia.     Assessment:  In summary, Telma Freed is a 19 year old woman with no significant past medical history who presented in 2019 after she " "was diagnosed with mono and developed thrombocytopenia to platelet count of <40K. She was noted to have thrombocytopenia since 2014 and personal history of easy bruising.      1). Chronic thrombocytopenia  2). Easy bruising with normal platelet aggregation and VWF evaluation  3). Low serum ferritin     Ms. Freed presented for video evaluation. She is currently in [place]. During her initial evaluation in May 2019 she had a  personal history of easy bruising and had an ISTH bleeding assessment score of 5 in clinic today (Clara BUSTOS et al. 2014 [facility]), in female patients a score of <6 adult female is considered normal. The biggest caveat is that this score is weighed toward VWD not platelet disorders AND patient has not gone through extreme stressors (childbirth, major surgery). Her mother also has history of easy bruising and heavy menstrual bleeding- raising concern for an inheirted disorder. Her evaluation, which included PFA, VWF screening and platelet aggregometry was normal. She continued to have platelet count of 120. Her peripheral blood has normal platelet morphology, making macrothrombocytopenia less likely    In last year, Telma has done well, except she has developed petechiae and ecchymosis on her lower body. This has been getting progressively worse. No recent CBCs.      At present, her diagnosis in likely ITP, as this is a diagnosis of exclusion. However, given her young age and her presentation, I would entertain evaluation with collagen binding. Patient does not have hyper-extensibility- but with potential \"running induced\" bruising could entertain collagen disorder such as William Danlos.     I have reached out to collegues in [place] to determine if they can assist with getting patient a platelet count. She has not received treatment for ITP, so would start with dexamethasone 40 mg PO x4 days q3 weeks.     Telma is returning to [place] in August and will [facility] in [facility]        Plan:  1. Majority of today's visit was spent counseling the patient regarding thrombocytopenia.  2. Contact [facility] to obtain CBC   3. If platelets are <40,000 would consider empiric treatment for ITP        The patient is given our center's contact information and is instructed to call if she should have any further questions or concerns.  Otherwise, we will plan on seeing her back Follow up with Provider - AUGUST at [facility] (in-basket sent to schedulers there)          Yin Chin MD/PhD   of Medicine  [employer]   ______________________________________________________  Please refer to notes from Luz Reyes. Telma was able to get platelet count in [place] and her numbers are normal.     She would like to wait for late August appointment with me to do further diagnostic testing for bleeding disorder. Would include evaluation for FXIII, alpha-2 AP, etc. Would also include Sana Araujo for genetics.   Telma would like to try Lysteda.   Will send prescription to [facility] pharmacy and they can contact her on Monday to discuss shipping.     Yin Chin MD/PhD   of Medicine  Division of Hematology, Oncology and Transplantation  Pager [phone]  5:22 PM    ----------------------------------------------------------------------------------------------------------------------     Interval History:  Telma Freed is a 19 year old woman who first presented in May 2019 in consultation due to chronic thrombocytopenia (platelets ~120) with recent worsening due to mononucleosis and lifelong history of easy bruising. Please refer to my initial consult note for further details.     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 She is transferring to the [employer]  starting in the fall.     Recently Telma has noted petchiae all over her arms and can not get them to go away. Not sure if her platelet count is low. She will get them on her legs from running. Has been working out every day. On both sides of hips and up her low back. On chest and arms. They do not heal. WIll get dark and plum colored. They will fade and get back. Not sure what to do them wrong.     She does not eat red meat, but otherwise eats other animal protein. She has not noted gum bleeding. She denies having fevers, night sweats or chills.      Past Medical History:  EBV Mono     Past Surgical History:  Adenoids     Medications:  Current Outpatient Medications   Medication     GIANVI 3-0.02 MG tablet     No current facility-administered medications for this visit.            Allergies:       Allergies   Allergen Reactions     Seasonal Allergies Hives        ROS:  A 7 point ROS is negative except as stated in the HPI     Social History:  Denies any tobacco use. Drinks alcohol on weekends. Abstained during her acute illness with mono. Denies any illicit drug use. Patient is a senior at the [employer] and will start [employer] at [email or web address] in the fall.     Family History:  Mother with easy bruising, heavy bleeding   Younger brother with no bleeding history per patient  Maternal grandmother  Maternal grandfather  Maternal aunt     Patient is not aware of her father's family history    Objective:  GENERAL: Healthy, alert and no distress  EYES: Eyes grossly normal to inspection.  No discharge or erythema, or obvious scleral/conjunctival abnormalities.  RESP: No audible wheeze, cough, or visible cyanosis.  No visible retractions or increased work of breathing.    SKIN: Visible skin clear. Patient stood up and demonstrated large patches of ecchymosis over bilateral hips and on extensor forearms. No other significant rash, abnormal pigmentation or lesions.  NEURO: Cranial nerves grossly intact.  Mentation and  speech appropriate for age.  PSYCH: Mentation appears normal, affect normal/bright, judgement and insight intact, normal speech and appearance well-groomed.       Labs: personally reviewed with relevant trends annotated below    Bleeding evaluation  The von Willebrand factor antigen (VWF:Ag) was 124% (range %), von Willebrand factor activity (VWF:ACT) was 122% (range %), and Factor 8 levels are within normal limits (130%, range %). Ristocetin Cofactor Activity (VWF:RCo) is normal at 79% (range %). The Factor 8  to VWF:Ag ratio and the VWF:ACT to VWF:Ag ratio are within normal limits.     The  VWF:RCo to VWF:Ag ratio is decreased/borderline low normal.       The distribution of plasma von Willebrand factor multimers is normal (see report from [employer]).     In light of the presence of a  decreased/borderline low normal VWF:RCo  to VWF:Ag ratio, presence of  a Type 2 variant (e.g. Type 2M) and some allele variants not associated with bleeding (Flood et al. Blood 2010;116:280-6 and Flood et al. Blood 2013;121:3742-4) can not be excluded.       Platelet aggregometry  Arachadonic Acid  See table below     Comment: (Note)   COMMENTS:                       Normal platelet aggregation study.  The patient is noted to have mild   thrombocytopenia and the platelet count of the platelet rich plasma   (PRP) used for testing is mildly decreased. Platelet aggregation   reference ranges have been established for a platelet count of   250x10^9/L in the PRP.  Due to thrombocytopenia in the PRP,   aggregation tracings are interpreted based on ranges reported in   literature (Khai et al. Thromb Haemost 2008;100:134-145). Maximal   platelet aggregation is within normal limits with ADP, Epinephrine,   Collagen, Arachidonic Acid, and low and high doses of Ristocetin.   ATP release is within normal limits with ADP and Thrombin. These   findings are non-diagnostic for a platelet function disorder. If a    platelet function disorder is suspected, consider repeat testing and   electron microscopy.                                       Brandee Medeiros M.D. [phone]                       6/26/2019                                   ATP RELEASE:             ATP Release with ADP:          Normal, 0.87 nm       ATP Release with Thrombin:     Normal, 1.32 nm       AGGREGATION:   Reagent  Concentration    Primary Agg         Maximal Agg   ADP      5 micromolar     Single large wave   Normal, 77%   EPI      10 micromolar    Present             Normal, 72%   COL      2.0 mcg/mL       -------             Normal, 90%   Ar. A.   0.50 mg/mL       -------             Normal, 71%   REFERENCE RANGES:   ATP release with TBN  Greater than or equal to 0.50 nm   ATP release with ADP  Greater than or equal to 0.40 nm   ADP % Agg             Greater than or equal to 64%   EPI % Agg             Greater than or equal to 63%   COL % Agg             Greater than or equal to 66%   AA  % Agg             Greater than or equal to 63%    Ristocetin-4 Conc  (Note)     Comment: COMMENTS:                       See full platelet aggregation comments.                       Brandee Medeiros M.D. [phone]                       6/26/2019                         RISTOCETIN:   Concentration    Primary Agg        Maximal Agg   0.50 mg/mL       Absent             Normal, 4%     1.00 mg/mL       Present            Normal, 83%   1.25 mg/mL       Single large wave  Normal, 91%   REFERENCE RANGES:   Ristocetin Conc.  % Aggregation   Risto 0.5 mg/mL   Less than or equal to 6%   Risto 1.0 mg/mL   Greater than or equal to 11%   Risto 1.25 mg/mL  Greater than or equal to 76%      Hepatitis B surface and core antibodies were nonreactive  CMV IgG negative  HIV and Hepatitis C negative    EBV DNA was positive with 19,473 DNA copies/mL-- consistent with history of mono  Pargovirus B19 IgG positive, IgM negative

## 2020-06-04 ENCOUNTER — TELEPHONE (OUTPATIENT)
Dept: HEMATOLOGY | Facility: CLINIC | Age: 20
End: 2020-06-04

## 2020-06-04 PROBLEM — D69.3 IDIOPATHIC THROMBOCYTOPENIA (H): Status: ACTIVE | Noted: 2019-04-01

## 2020-06-04 NOTE — TELEPHONE ENCOUNTER
After a virtual visit yesterday with Dr. Lyman it was decided that it would be prudent to obtain a CBC and platelet count due to current symptoms. [facility] - nurse coordinator contact Zion Cerna ([phone]) Her results:  6/4/2020     6.05   4.32   12.8   38   88   29.6   33.6   132 (L)   12.5   WBC   RBC   Hemoglobin   Hematocrit   MCV   MCH   MCHC   Platelet Count   RDW-CV   I spoke to Ms. Freed and gave her the results. Dr. Chin will call her tomorrow to discuss further.

## 2020-06-05 RX ORDER — TRANEXAMIC ACID 650 MG/1
1300 TABLET ORAL 2 TIMES DAILY PRN
Qty: 60 TABLET | Refills: 3 | Status: SHIPPED | OUTPATIENT
Start: 2020-06-05 | End: 2020-08-12

## 2020-08-03 ENCOUNTER — VIRTUAL VISIT (OUTPATIENT)
Dept: HEMATOLOGY | Facility: CLINIC | Age: 20
End: 2020-08-03
Attending: GENETIC COUNSELOR, MS
Payer: COMMERCIAL

## 2020-08-03 DIAGNOSIS — D69.3 IDIOPATHIC THROMBOCYTOPENIA (H): Primary | ICD-10-CM

## 2020-08-03 PROCEDURE — 96040 ZZH GENETIC COUNSELING, EACH 30 MINUTES: CPT | Mod: 95

## 2020-08-03 NOTE — PROGRESS NOTES
"8/3/2020    Telma Freed is a 20 year old female who is being evaluated via a telephone visit.      The patient has been notified of following:     \"This telephone visit will be conducted via a call between you and your physician/provider. We have found that certain health care needs can be provided without the need for a physical exam.  This service lets us provide the care you need with a short phone conversation.  If lab work is needed we can place an order for that and you can then stop by our lab to have the test done at a later time.    Telephone visits are billed at different rates depending on your insurance coverage. During this emergency period, for some insurers they may be billed the same as an in-person visit.  Please reach out to your insurance provider with any questions.    If during the course of the call the physician/provider feels a telephone visit is not appropriate, you will not be charged for this service.\"    Patient has given verbal consent for Telephone visit?  Yes    What phone number would you like to be contacted at? [phone]    How would you like to obtain your AVS? N/A    Phone call duration: 19 minutes    8/3/2020    Presenting Information: Telma Frede was seen by [facility] visit through [facility] today. I met with her per the request of Dr. Yin Chin to obtain a family history. 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 She has recently transferred, and will finish out her last two years of school here.     Personal History:   Briefly, Telma is a 20 year old woman with a history of thrombocytopenia and easy bruising.  She reports she has \"thin skin\" and that she believes her wounds take longer to heal. She states that her scars seem to last longer than typical, and are darker red/purple. She reports bleeding with wisdom teeth extraction. She reports she is not hyperflexible.  Please see " "Dr. Chin's notes for additional details regarding her personal history.     Family History: A three generation pedigree, specific to bleeding was obtained today and scanned into the EMR. The following information is significant:     Brother, age 17, has no bleeding concerns. He has not had his platelets checked.      Mother, age 49, has heavy periods for which she had a \"surgery to remove her uterine lining\" to control the bleeding. She also reportedly has experienced easy bleeding and it takes longer for wounds to heal for her. She had BRCA testing due to her mother's history of breast cancer, and was negative according to Telma. Telma wondered if this gene skipped generations. We discussed that gene mutations do not skip generations, but that there are other cancer risk genes associated with breast cancer as new genetic testing has become available somewhat recently for genes beyond BRCA1/2. She was encouraged to talked to her mother about this, as it may be something her mother may wish to look into.     Maternal grandmother had breast cancer in her mid 50s, and passed away in her late 50s. Telma is unsure if she had bleeding issues prior to her death. There is no other known family history of breast cancer reported    She has no contact with her maternal grandfather, and has no health information on him.     Maternal aunt is 53 and has no bleeding issues. She has not had easy bruising. Her two children have not had bleeding concerns.     Telma has no contact with her father. She does know that he has not had bruising issues. He may have hypertension but she is not sure. She states that he passes out after blood draws, but that this is not linked to fear of blood draws, but more a physical reaction. He has Leichen sclerosis which she reports she also has.     She reports that her paternal grandmother and one of her paternal uncles has had \"bruising and bleeding issues\" but she is not sure of the details. " She states that her uncle used to have to sit out of wrestling practice a lot as a teen due to these issues. Her grandmother  in her 60s due to lung cancer; she was a heavy smoker.     She has little contact with her father's side of the family. She reports that many paternal relatives have had issues with alcoholism, including an uncle and her grandfather. She stated she believes there is a genetic component to this. We discussed that there can be a hereditary aspect to alcoholism and addiction, though this is complex and there are likely multifactorial contributions to risk.  She states that she is careful about alcohol use as a precaution because of her family history.    The family history is otherwise negative for known bleeding concerns.    Plan:   1. A three generation pedigree was obtained and will be scanned into the EMR.  2. I will share this information with Dr. Chin. Telma has an appointment with her next week. I will talk with Dr. Chin to determine if further genetic workup is indicated for Telma, and will follow up with her as needed.    Approximate Time Spent in Consultation: 19 minutes.     Sana Araujo MS, [facility]  Licensed, Certified Genetic Counselor  P. [phone]  F. [phone]    CC: No Letter

## 2020-08-12 ENCOUNTER — OFFICE VISIT (OUTPATIENT)
Dept: HEMATOLOGY | Facility: CLINIC | Age: 20
End: 2020-08-12
Attending: INTERNAL MEDICINE
Payer: COMMERCIAL

## 2020-08-12 VITALS
WEIGHT: 149.2 LBS | BODY MASS INDEX: 23.42 KG/M2 | TEMPERATURE: 97.2 F | HEIGHT: 67 IN | DIASTOLIC BLOOD PRESSURE: 74 MMHG | RESPIRATION RATE: 12 BRPM | SYSTOLIC BLOOD PRESSURE: 125 MMHG | OXYGEN SATURATION: 97 % | HEART RATE: 77 BPM

## 2020-08-12 DIAGNOSIS — M76.32 IT BAND SYNDROME, LEFT: ICD-10-CM

## 2020-08-12 DIAGNOSIS — R23.3 EASY BRUISING: ICD-10-CM

## 2020-08-12 DIAGNOSIS — D69.3 IDIOPATHIC THROMBOCYTOPENIA (H): Primary | ICD-10-CM

## 2020-08-12 DIAGNOSIS — D69.9 BLEEDING DIATHESIS (H): ICD-10-CM

## 2020-08-12 PROCEDURE — 99214 OFFICE O/P EST MOD 30 MIN: CPT | Performed by: INTERNAL MEDICINE

## 2020-08-12 PROCEDURE — G0463 HOSPITAL OUTPT CLINIC VISIT: HCPCS

## 2020-08-12 RX ORDER — AMINOCAPROIC ACID 1000 MG/1
3000 TABLET ORAL EVERY 8 HOURS PRN
Qty: 45 TABLET | Refills: 3 | Status: SHIPPED | OUTPATIENT
Start: 2020-08-12 | End: 2021-05-05

## 2020-08-12 RX ORDER — TRANEXAMIC ACID 650 MG/1
1300 TABLET ORAL 2 TIMES DAILY PRN
Qty: 60 TABLET | Refills: 3 | Status: SHIPPED | OUTPATIENT
Start: 2020-08-12 | End: 2020-08-12 | Stop reason: ALTCHOICE

## 2020-08-12 RX ORDER — AMINOCAPROIC ACID 500 MG/1
3 TABLET ORAL EVERY 8 HOURS SCHEDULED
Status: DISCONTINUED | OUTPATIENT
Start: 2020-08-12 | End: 2020-08-12

## 2020-08-12 RX ORDER — AMINOCAPROIC ACID 500 MG/1
1500 TABLET ORAL EVERY 8 HOURS SCHEDULED
Status: DISCONTINUED | OUTPATIENT
Start: 2020-08-12 | End: 2020-08-12

## 2020-08-12 ASSESSMENT — MIFFLIN-ST. JEOR: SCORE: 1469.4

## 2020-08-12 ASSESSMENT — PAIN SCALES - GENERAL: PAINLEVEL: NO PAIN (0)

## 2020-08-12 NOTE — PROGRESS NOTES
[facility]     Outpatient Visit Note:     Patient: Telma Freed  MRN: [id number]  : 2000  Date of Initial Consult: May 1, 2019  Date of Visit: 2020        Reason for Consultation:  Telma Freed is a referred by Franny Beaver MD for evaluation and treatment of thrombocytopenia.     Assessment:  In summary, Telma Freed is a 19 year old woman with no significant past medical history who presented in 2019 after she was diagnosed with mono and developed thrombocytopenia to platelet count of <40K. She was noted to have thrombocytopenia since  and personal history of easy bruising.      1). Chronic thrombocytopenia  2). Easy bruising with normal platelet aggregation and VWF evaluation  3). Low serum ferritin     During her initial evaluation in May 2019 she had a  personal history of easy bruising and had an ISTH bleeding assessment score of 5 in clinic today (Clara BUSTOS et al. 2014 J), in female patients a score of <6 adult female is considered normal. The biggest caveat is that this score is weighed toward VWD not platelet disorders AND patient has not gone through extreme stressors (childbirth, major surgery).     Her mother also has history of easy bruising and heavy menstrual bleeding- raising concern for an inheirted disorder. Her evaluation, which included PFA, VWF screening and platelet aggregometry was normal. She continued to have platelet count of 120. Her peripheral blood has normal platelet morphology, making macrothrombocytopenia less likely    In last year, Telma has done well, except she has developed petechiae and ecchymosis on her lower body. This has been getting progressively worse. No recent CBCs.      At present, her diagnosis in likely ITP, as this is a diagnosis of exclusion. However, given her young age and her presentation, I would entertain evaluation with collagen binding. Patient does not have hyper-extensibility-  "but with potential \"running induced\" bruising could entertain collagen disorder such as William Danlos.           Plan:  1. Majority of today's visit was spent counseling the patient regarding thrombocytopenia.  2.   Orders Placed This Encounter   Procedures     [id number] VWFCOL: Laboratory Miscellaneous Order     VWF-FVIII normady [id number]: Laboratory Miscellaneous Order     [id number] WVF propeptide antigen: Laboratory Miscellaneous Order     CBC with platelets differential     Comprehensive metabolic panel     Ferritin     Iron and iron binding capacity     Reticulocyte count     [id number] platelet associated immunoglobulins: Laboratory Miscellaneous Order     Process and hold DNA     Total Time Spent:  I spent a total of 30 minutes face-to-face with Telma Freed during today's office visit.  Over 50% of this time was spent counseling the patient and/or coordinating care regarding bleeding evaluation.      The patient is given our center's contact information and is instructed to call if she should have any further questions or concerns.  Otherwise, we will plan on seeing her back Follow up with Provider - 3-4 weeks via virtual visit to discuss test results.          Yin Chin MD/PhD   of Medicine  [facility]     ----------------------------------------------------------------------------------------------------------------------     Interval History:  Telma Freed is a 20 year old woman who first presented in May 2019 in consultation due to chronic thrombocytopenia (platelets ~120) with recent worsening due to mononucleosis and lifelong history of easy bruising. Please refer to my initial consult note for further details.     Telma alejandre has returned from [place]. She had severe bruising that improved with use of tranexamic acid. She continues to have heavy menstrual bleeding. This is controlled by birth control pills- but if she misses even one pill she " "will have continuous bleeding. She is set to start the sugar pills next week.   She does not eat red meat, but otherwise eats other animal protein. She has not noted gum bleeding. She denies having fevers, night sweats or chills.      Past Medical History:  EBV Mono     Past Surgical History:  Adenoids     Medications:  Current Outpatient Medications   Medication     GIANVI 3-0.02 MG tablet     tranexamic acid (LYSTEDA) 650 MG tablet     No current facility-administered medications for this visit.         Allergies:       Allergies   Allergen Reactions     Seasonal Allergies Hives        ROS:  A 7 point ROS is negative except as stated in the HPI     Social History:  Denies any tobacco use. Drinks alcohol on weekends. Abstained during her acute illness with mono. Denies any illicit drug use. Patient is a senior at the [employer] and will start [employer] school at [employer] in the fall.     Family History:  Mother with easy bruising, heavy bleeding   Younger brother with no bleeding history per patient  Maternal grandmother  Maternal grandfather  Maternal aunt     Patient is not aware of her father's family history    Objective:  /74 (BP Location: Right arm, Patient Position: Sitting, Cuff Size: Adult Regular)   Pulse 77   Temp 97.2  F (36.2  C) (Oral)   Resp 12   Ht 1.702 m (5' 7\")   Wt 67.7 kg (149 lb 3.2 oz)   SpO2 97%   BMI 23.37 kg/m      GENERAL: Healthy, alert and no distress  EYES: Eyes grossly normal to inspection.  No discharge or erythema, or obvious scleral/conjunctival abnormalities.  RESP: No audible wheeze, cough, or visible cyanosis.  No visible retractions or increased work of breathing.    SKIN: Visible skin clear. Patient stood up and demonstrated large patches of ecchymosis over bilateral hips and on extensor forearms. No other significant rash, abnormal pigmentation or lesions.  NEURO: Cranial nerves grossly intact.  Mentation and speech appropriate for age.  PSYCH: Mentation appears " normal, affect normal/bright, judgement and insight intact, normal speech and appearance well-groomed.       Labs: personally reviewed with relevant trends annotated below    Bleeding evaluation  The von Willebrand factor antigen (VWF:Ag) was 124% (range %), von Willebrand factor activity (VWF:ACT) was 122% (range %), and Factor 8 levels are within normal limits (130%, range %). Ristocetin Cofactor Activity (VWF:RCo) is normal at 79% (range %). The Factor 8  to VWF:Ag ratio and the VWF:ACT to VWF:Ag ratio are within normal limits.     The  VWF:RCo to VWF:Ag ratio is decreased/borderline low normal.       The distribution of plasma von Willebrand factor multimers is normal (see report from [facility]).     In light of the presence of a  decreased/borderline low normal VWF:RCo  to VWF:Ag ratio, presence of  a Type 2 variant (e.g. Type 2M) and some allele variants not associated with bleeding (Flood et al. Blood 2010;116:280-6 and Flood et al. Blood 2013;121:3742-4) can not be excluded.       Platelet aggregometry  Arachadonic Acid  See table below     Comment: (Note)   COMMENTS:                       Normal platelet aggregation study.  The patient is noted to have mild   thrombocytopenia and the platelet count of the platelet rich plasma   (PRP) used for testing is mildly decreased. Platelet aggregation   reference ranges have been established for a platelet count of   250x10^9/L in the PRP.  Due to thrombocytopenia in the PRP,   aggregation tracings are interpreted based on ranges reported in   literature (Khai et al. Thromb Haemost 2008;100:134-145). Maximal   platelet aggregation is within normal limits with ADP, Epinephrine,   Collagen, Arachidonic Acid, and low and high doses of Ristocetin.   ATP release is within normal limits with ADP and Thrombin. These   findings are non-diagnostic for a platelet function disorder. If a   platelet function disorder is suspected, consider  repeat testing and   electron microscopy.                                       Brandee Medeiros M.D. [phone]                       6/26/2019                                   ATP RELEASE:             ATP Release with ADP:          Normal, 0.87 nm       ATP Release with Thrombin:     Normal, 1.32 nm       AGGREGATION:   Reagent  Concentration    Primary Agg         Maximal Agg   ADP      5 micromolar     Single large wave   Normal, 77%   EPI      10 micromolar    Present             Normal, 72%   COL      2.0 mcg/mL       -------             Normal, 90%   Ar. A.   0.50 mg/mL       -------             Normal, 71%   REFERENCE RANGES:   ATP release with TBN  Greater than or equal to 0.50 nm   ATP release with ADP  Greater than or equal to 0.40 nm   ADP % Agg             Greater than or equal to 64%   EPI % Agg             Greater than or equal to 63%   COL % Agg             Greater than or equal to 66%   AA  % Agg             Greater than or equal to 63%    Ristocetin-4 Conc  (Note)     Comment: COMMENTS:                       See full platelet aggregation comments.                       Brandee Medeiros M.D. [phone]                       6/26/2019                         RISTOCETIN:   Concentration    Primary Agg        Maximal Agg   0.50 mg/mL       Absent             Normal, 4%     1.00 mg/mL       Present            Normal, 83%   1.25 mg/mL       Single large wave  Normal, 91%   REFERENCE RANGES:   Ristocetin Conc.  % Aggregation   Risto 0.5 mg/mL   Less than or equal to 6%   Risto 1.0 mg/mL   Greater than or equal to 11%   Risto 1.25 mg/mL  Greater than or equal to 76%      Hepatitis B surface and core antibodies were nonreactive  CMV IgG negative  HIV and Hepatitis C negative    EBV DNA was positive with 19,473 DNA copies/mL-- consistent with history of mono  Pargovirus B19 IgG positive, IgM negative

## 2020-08-12 NOTE — PATIENT INSTRUCTIONS
Labs in 1 week    Follow up in 3-4 weeks with Dr. Chin Virtual to discuss results    Yin Chin MD/PhD   of Medicine  Division of Hematology, Oncology and Transplantation    [facility]  [address] [place] [id number]  Main: [phone], Fax: [phone]

## 2020-08-17 ENCOUNTER — ALLIED HEALTH/NURSE VISIT (OUTPATIENT)
Dept: HEMATOLOGY | Facility: CLINIC | Age: 20
End: 2020-08-17
Payer: COMMERCIAL

## 2020-08-17 DIAGNOSIS — D69.3 IDIOPATHIC THROMBOCYTOPENIA (H): ICD-10-CM

## 2020-08-17 DIAGNOSIS — D69.9 BLEEDING DIATHESIS (H): ICD-10-CM

## 2020-08-17 LAB
ALBUMIN SERPL-MCNC: 3.4 G/DL (ref 3.4–5)
ALP SERPL-CCNC: 56 U/L (ref 40–150)
ALT SERPL W P-5'-P-CCNC: 19 U/L (ref 0–50)
ANION GAP SERPL CALCULATED.3IONS-SCNC: 7 MMOL/L (ref 3–14)
AST SERPL W P-5'-P-CCNC: 27 U/L (ref 0–45)
BASOPHILS # BLD AUTO: 0.1 10E9/L (ref 0–0.2)
BASOPHILS NFR BLD AUTO: 0.8 %
BILIRUB SERPL-MCNC: 0.4 MG/DL (ref 0.2–1.3)
BUN SERPL-MCNC: 8 MG/DL (ref 7–30)
CALCIUM SERPL-MCNC: 8.7 MG/DL (ref 8.5–10.1)
CHLORIDE SERPL-SCNC: 115 MMOL/L (ref 94–109)
CO2 SERPL-SCNC: 19 MMOL/L (ref 20–32)
CREAT SERPL-MCNC: 0.57 MG/DL (ref 0.52–1.04)
DIFFERENTIAL METHOD BLD: ABNORMAL
EOSINOPHIL # BLD AUTO: 0.2 10E9/L (ref 0–0.7)
EOSINOPHIL NFR BLD AUTO: 2.7 %
ERYTHROCYTE [DISTWIDTH] IN BLOOD BY AUTOMATED COUNT: 12.4 % (ref 10–15)
FERRITIN SERPL-MCNC: 10 NG/ML (ref 12–150)
GFR SERPL CREATININE-BSD FRML MDRD: >90 ML/MIN/{1.73_M2}
GLUCOSE SERPL-MCNC: 100 MG/DL (ref 70–99)
HCT VFR BLD AUTO: 41.4 % (ref 35–47)
HGB BLD-MCNC: 13.5 G/DL (ref 11.7–15.7)
IMM GRANULOCYTES # BLD: 0 10E9/L (ref 0–0.4)
IMM GRANULOCYTES NFR BLD: 0.2 %
IRON SATN MFR SERPL: NORMAL % (ref 15–46)
IRON SERPL-MCNC: NORMAL UG/DL (ref 35–180)
LYMPHOCYTES # BLD AUTO: 2.1 10E9/L (ref 0.8–5.3)
LYMPHOCYTES NFR BLD AUTO: 35.7 %
MCH RBC QN AUTO: 29.7 PG (ref 26.5–33)
MCHC RBC AUTO-ENTMCNC: 32.6 G/DL (ref 31.5–36.5)
MCV RBC AUTO: 91 FL (ref 78–100)
MONOCYTES # BLD AUTO: 0.5 10E9/L (ref 0–1.3)
MONOCYTES NFR BLD AUTO: 7.8 %
NEUTROPHILS # BLD AUTO: 3.1 10E9/L (ref 1.6–8.3)
NEUTROPHILS NFR BLD AUTO: 52.8 %
NRBC # BLD AUTO: 0 10*3/UL
NRBC BLD AUTO-RTO: 0 /100
PLATELET # BLD AUTO: 143 10E9/L (ref 150–450)
POTASSIUM SERPL-SCNC: 4.1 MMOL/L (ref 3.4–5.3)
PROT SERPL-MCNC: 6.9 G/DL (ref 6.8–8.8)
RBC # BLD AUTO: 4.54 10E12/L (ref 3.8–5.2)
RETICS # AUTO: 47.7 10E9/L (ref 25–95)
RETICS/RBC NFR AUTO: 1.1 % (ref 0.5–2)
SODIUM SERPL-SCNC: 141 MMOL/L (ref 133–144)
TIBC SERPL-MCNC: NORMAL UG/DL (ref 240–430)
WBC # BLD AUTO: 5.9 10E9/L (ref 4–11)

## 2020-08-17 PROCEDURE — 85045 AUTOMATED RETICULOCYTE COUNT: CPT | Performed by: INTERNAL MEDICINE

## 2020-08-17 PROCEDURE — 85240 CLOT FACTOR VIII AHG 1 STAGE: CPT | Performed by: INTERNAL MEDICINE

## 2020-08-17 PROCEDURE — 86023 IMMUNOGLOBULIN ASSAY: CPT | Performed by: INTERNAL MEDICINE

## 2020-08-17 PROCEDURE — 36415 COLL VENOUS BLD VENIPUNCTURE: CPT

## 2020-08-17 PROCEDURE — 82728 ASSAY OF FERRITIN: CPT | Performed by: INTERNAL MEDICINE

## 2020-08-17 PROCEDURE — 83520 IMMUNOASSAY QUANT NOS NONAB: CPT | Performed by: INTERNAL MEDICINE

## 2020-08-17 PROCEDURE — 84999 UNLISTED CHEMISTRY PROCEDURE: CPT | Performed by: INTERNAL MEDICINE

## 2020-08-17 PROCEDURE — 36415 COLL VENOUS BLD VENIPUNCTURE: CPT | Performed by: INTERNAL MEDICINE

## 2020-08-17 PROCEDURE — 85246 CLOT FACTOR VIII VW ANTIGEN: CPT | Performed by: INTERNAL MEDICINE

## 2020-08-17 PROCEDURE — 80053 COMPREHEN METABOLIC PANEL: CPT | Performed by: INTERNAL MEDICINE

## 2020-08-17 PROCEDURE — 85025 COMPLETE CBC W/AUTO DIFF WBC: CPT | Performed by: INTERNAL MEDICINE

## 2020-08-17 PROCEDURE — 83540 ASSAY OF IRON: CPT | Performed by: INTERNAL MEDICINE

## 2020-08-17 PROCEDURE — 83550 IRON BINDING TEST: CPT | Performed by: INTERNAL MEDICINE

## 2020-08-18 LAB
MISCELLANEOUS TEST: NORMAL
MISCELLANEOUS TEST: NORMAL

## 2020-08-28 LAB — LAB SCANNED RESULT: NORMAL

## 2020-09-02 LAB — LAB SCANNED RESULT: NORMAL

## 2020-09-04 LAB — LAB SCANNED RESULT: NORMAL

## 2020-09-23 ENCOUNTER — VIRTUAL VISIT (OUTPATIENT)
Dept: HEMATOLOGY | Facility: CLINIC | Age: 20
End: 2020-09-23
Attending: INTERNAL MEDICINE
Payer: COMMERCIAL

## 2020-09-23 VITALS — BODY MASS INDEX: 23.02 KG/M2 | WEIGHT: 147 LBS

## 2020-09-23 DIAGNOSIS — D69.3 IDIOPATHIC THROMBOCYTOPENIA (H): Primary | ICD-10-CM

## 2020-09-23 PROCEDURE — 99214 OFFICE O/P EST MOD 30 MIN: CPT | Mod: 95 | Performed by: INTERNAL MEDICINE

## 2020-09-23 RX ORDER — INFLUENZA A VIRUS A/GUANGDONG-MAONAN/SWL1536/2019 CNIC-1909 (H1N1) ANTIGEN (FORMALDEHYDE INACTIVATED), INFLUENZA A VIRUS A/HONG KONG/2671/2019 (H3N2) ANTIGEN (FORMALDEHYDE INACTIVATED), INFLUENZA B VIRUS B/PHUKET/3073/2013 ANTIGEN (FORMALDEHYDE INACTIVATED), AND INFLUENZA B VIRUS B/WASHINGTON/02/2019 ANTIGEN (FORMALDEHYDE INACTIVATED) 15; 15; 15; 15 UG/.5ML; UG/.5ML; UG/.5ML; UG/.5ML
INJECTION, SUSPENSION INTRAMUSCULAR
COMMUNITY
Start: 2020-08-27 | End: 2021-06-01

## 2020-09-23 RX ORDER — LEVOCETIRIZINE DIHYDROCHLORIDE 5 MG/1
TABLET, FILM COATED ORAL
COMMUNITY
Start: 2020-08-14 | End: 2022-06-15

## 2020-09-23 NOTE — PROGRESS NOTES
"  [facility]     Outpatient Visit Note:     Patient: Telma Freed  MRN: [id number]  : 2000  Date of Initial Consult: May 1, 2019  Date of Visit:20    Telma Freed is a 20 year old female who is being evaluated via a billable video visit.      The patient has been notified of following:     \"This video visit will be conducted via a call between you and your physician/provider. We have found that certain health care needs can be provided without the need for an in-person physical exam.  This service lets us provide the care you need with a video conversation.  If a prescription is necessary we can send it directly to your pharmacy.  If lab work is needed we can place an order for that and you can then stop by our lab to have the test done at a later time.    Video visits are billed at different rates depending on your insurance coverage.  Please reach out to your insurance provider with any questions.    If during the course of the call the physician/provider feels a video visit is not appropriate, you will not be charged for this service.\"    Patient has given verbal consent for Video visit? Yes  How would you like to obtain your AVS? MyChart  If you are dropped from the video visit, the video invite should be resent to: Text to cell phone: see MyChart  Will anyone else be joining your video visit? No        Video-Visit Details    Type of service:  Video Visit    Video Start Time: 8:40 am  Video End Time: 8:55 AM    Originating Location (pt. Location): Home    Distant Location (provider location):  [facility]     Platform used for Video Visit: Ian Chin MD/PhD       Reason for Consultation:  Telma Freed is a referred by Franny Beaver MD for evaluation and treatment of thrombocytopenia.     Assessment:  In summary, Telma Freed is a 20 year old woman with no significant past medical history " who presented in 2019 after she was diagnosed with mono and developed thrombocytopenia to platelet count of <40K. She was noted to have thrombocytopenia since 2014 and personal history of easy bruising.      1). Chronic thrombocytopenia, likely ITP  2). Easy bruising with normal platelet aggregation and VWF evaluation  3). Low serum ferritin     During her initial evaluation in May 2019 she had a  personal history of easy bruising and had an ISTH bleeding assessment score of 5 (Clara BUSTOS et al. 2014 [facility]), in female patients a score of <6 adult female is considered normal. The biggest caveat is that this score is weighed toward VWD not platelet disorders AND patient has not gone through extreme stressors (childbirth, major surgery).     Her mother also has history of easy bruising and heavy menstrual bleeding- raising concern for an inheirted disorder. Her evaluation, which included PFA, VWF screening and platelet aggregometry was normal. She continued to have platelet count of 120. Her peripheral blood has normal platelet morphology, making macrothrombocytopenia less likely. Repeat VWF testing including collagen binding, propeptide and 2N testing remained normal. At present I believe a VWF diagnosis is unlikely.    Regarding her thrombocytopenia, her likely diagnosis is ITP. She does have presentation with menorrhagia, severe petechiae as well as ecchymosis that is NOT typical for her platelet levels. There have been treated with use of antifibrinolytics. It is possible that Telma may have either a collagen defect (William Danlos) or fibrinolytic disorder. She has started an IUD and will determine if this helps with menorrhagia. Counseled patient that concurrent use of antifibrinolytics and IUD is warranted in her case.     Regarding her low ferritin- I will have patient start an iron tablet or pre- vitamin. If this remains low can consider iron infusion in the spring.     Telma is instructed to contact  clinic if bruising or new symptoms worsen.       Plan:  1. Majority of today's visit was spent counseling the patient regarding thrombocytopenia.  2.   Orders Placed This Encounter   Procedures     CBC with platelets differential     Ferritin     Iron and iron binding capacity     Reticulocyte count     Comprehensive metabolic panel   3. Start prenatal vitamin (for iron) or iron supplement  4. Continue with amicar 1 gram PRN post- runs  5. Contact clinic if symptoms worsen    The patient is given our center's contact information and is instructed to call if she should have any further questions or concerns.  Otherwise, we will plan on seeing her back Follow up with Provider - 6 months or sooner if needed      Yin Chin MD/PhD   of Medicine  [facility]     ----------------------------------------------------------------------------------------------------------------------     Interval History:  Telma Freed is a 20 year old woman who first presented in May 2019 in consultation due to chronic thrombocytopenia (platelets ~120) with recent worsening due to mononucleosis and lifelong history of easy bruising. Please refer to my initial consult note for further details.     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 No more petchiae. Got Mirena IUD on 8/24/2020.  Has been told to not take amicar while on IUD. Has been taking amicar three times a week after runs. Has skipped it after a run and not had more petchiae, just smaller bruises. May be from her puppy hitting her. Patient states she had heavy bleeding for 1.5 after it was implanted. No bleeding yet.       Past Medical History:  EBV Mono     Past Surgical History:  Adenoids     Medications:  Current Outpatient Medications   Medication     Aminocaproic Acid (AMICAR) 1000 MG TABS     levocetirizine (XYZAL) 5 MG tablet     levonorgestrel (MIRENA) 20 MCG/24HR IUD     FLUZONE QUADRIVALENT 0.5 ML injection      GIANVI 3-0.02 MG tablet     No current facility-administered medications for this visit.         Allergies:       Allergies   Allergen Reactions     Seasonal Allergies Hives        ROS:  A 7 point ROS is negative except as stated in the HPI     Social History:  Denies any tobacco use. Drinks alcohol on weekends. Abstained during her acute illness with mono. Denies any illicit drug use. 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     Family History:  Mother with easy bruising, heavy bleeding   Younger brother with no bleeding history per patient  Maternal grandmother  Maternal grandfather  Maternal aunt     Patient is not aware of her father's family history    Objective:  Wt 66.7 kg (147 lb)   BMI 23.02 kg/m      GENERAL: Healthy, alert and no distress  EYES: Eyes grossly normal to inspection.  No discharge or erythema, or obvious scleral/conjunctival abnormalities.  RESP: No audible wheeze, cough, or visible cyanosis.  No visible retractions or increased work of breathing.    SKIN: Visible skin clear. Patient stood up and demonstrated large patches of ecchymosis over bilateral hips and on extensor forearms. No other significant rash, abnormal pigmentation or lesions.  NEURO: Cranial nerves grossly intact.  Mentation and speech appropriate for age.  PSYCH: Mentation appears normal, affect normal/bright, judgement and insight intact, normal speech and appearance well-groomed.    The rest of a comprehensive physical examination is deferred due to Public Health Emergency video/telephone visit restrictions     Labs: personally reviewed with relevant trends annotated below    Bleeding evaluation  The von Willebrand factor antigen (VWF:Ag) was 124% (range %), von Willebrand factor activity (VWF:ACT) was 122% (range %), and Factor 8 levels are within normal limits (130%, range %). Ristocetin Cofactor Activity (VWF:RCo) is normal at 79% (range %).  The Factor 8  to VWF:Ag ratio and the VWF:ACT to VWF:Ag ratio are within normal limits.     The  VWF:RCo to VWF:Ag ratio is decreased/borderline low normal.       The distribution of plasma von Willebrand factor multimers is normal (see report from [facility]).     In light of the presence of a  decreased/borderline low normal VWF:RCo  to VWF:Ag ratio, presence of  a Type 2 variant (e.g. Type 2M) and some allele variants not associated with bleeding (Flood et al. Blood 2010;116:280-6 and Flood et al. Blood 2013;121:3742-4) can not be excluded.     VWF propeptide- normal  Collagen binding- normal  Platelet antibodies- negative platelet associated IgG antibodies      Platelet aggregometry  Arachadonic Acid  See table below     Comment: (Note)   COMMENTS:                       Normal platelet aggregation study.  The patient is noted to have mild   thrombocytopenia and the platelet count of the platelet rich plasma   (PRP) used for testing is mildly decreased. Platelet aggregation   reference ranges have been established for a platelet count of   250x10^9/L in the PRP.  Due to thrombocytopenia in the PRP,   aggregation tracings are interpreted based on ranges reported in   literature (Khai et al. Thromb Haemost 2008;100:134-145). Maximal   platelet aggregation is within normal limits with ADP, Epinephrine,   Collagen, Arachidonic Acid, and low and high doses of Ristocetin.   ATP release is within normal limits with ADP and Thrombin. These   findings are non-diagnostic for a platelet function disorder. If a   platelet function disorder is suspected, consider repeat testing and   electron microscopy.                                       Brandee Medeiros M.D. [phone]                       6/26/2019                                   ATP RELEASE:             ATP Release with ADP:          Normal, 0.87 nm       ATP Release with Thrombin:     Normal, 1.32 nm       AGGREGATION:   Reagent  Concentration    Primary  Agg         Maximal Agg   ADP      5 micromolar     Single large wave   Normal, 77%   EPI      10 micromolar    Present             Normal, 72%   COL      2.0 mcg/mL       -------             Normal, 90%   Ar. A.   0.50 mg/mL       -------             Normal, 71%   REFERENCE RANGES:   ATP release with TBN  Greater than or equal to 0.50 nm   ATP release with ADP  Greater than or equal to 0.40 nm   ADP % Agg             Greater than or equal to 64%   EPI % Agg             Greater than or equal to 63%   COL % Agg             Greater than or equal to 66%   AA  % Agg             Greater than or equal to 63%    Ristocetin-4 Conc  (Note)     Comment: COMMENTS:                       See full platelet aggregation comments.                       Brandee Medeiros M.D. [phone]                       6/26/2019                         RISTOCETIN:   Concentration    Primary Agg        Maximal Agg   0.50 mg/mL       Absent             Normal, 4%     1.00 mg/mL       Present            Normal, 83%   1.25 mg/mL       Single large wave  Normal, 91%   REFERENCE RANGES:   Ristocetin Conc.  % Aggregation   Risto 0.5 mg/mL   Less than or equal to 6%   Risto 1.0 mg/mL   Greater than or equal to 11%   Risto 1.25 mg/mL  Greater than or equal to 76%      Hepatitis B surface and core antibodies were nonreactive  CMV IgG negative  HIV and Hepatitis C negative    EBV DNA was positive with 19,473 DNA copies/mL-- consistent with history of mono  Pargovirus B19 IgG positive, IgM negative

## 2020-09-23 NOTE — LETTER
"2020      RE: Telma Freed  [address] [place] [address]       Patient was contacted to complete the pre-visit call prior to their video visit with the provider.  The following statement was read:       This visit will be billed to your insurance the same as an in-person visit. Because of Coronavirus we are instituting video visits when possible to keep everyone safe. This video visit will be conducted between you and the provider.  This service lets us provide the care you need with a video conversation.  If a prescription is necessary, we can send it directly to your pharmacy.If lab work or other testing is needed, we can help arrange a place/time for that to be done at a later date.If during the course of the call the provider feels a video visit is not appropriate, then your insurance company will not be billed.       Allergies and medications were reviewed and travel screening complete.     A test connection was Completed with Pt? No    I thanked them for their time to cover this information     Ethan Carroll [facility]            [facility]     Outpatient Visit Note:     Patient: Telma Freed  MRN: [id number]  : 2000  Date of Initial Consult: May 1, 2019  Date of Visit:20    Telma Freed is a 20 year old female who is being evaluated via a billable video visit.      The patient has been notified of following:     \"This video visit will be conducted via a call between you and your physician/provider. We have found that certain health care needs can be provided without the need for an in-person physical exam.  This service lets us provide the care you need with a video conversation.  If a prescription is necessary we can send it directly to your pharmacy.  If lab work is needed we can place an order for that and you can then stop by our lab to have the test done at a later time.    Video visits are billed at different rates depending on your " "insurance coverage.  Please reach out to your insurance provider with any questions.    If during the course of the call the physician/provider feels a video visit is not appropriate, you will not be charged for this service.\"    Patient has given verbal consent for Video visit? Yes  How would you like to obtain your AVS? MyChart  If you are dropped from the video visit, the video invite should be resent to: Text to cell phone: see MyChart  Will anyone else be joining your video visit? No        Video-Visit Details    Type of service:  Video Visit    Video Start Time: 8:40 am  Video End Time: 8:55 AM    Originating Location (pt. Location): Home    Distant Location (provider location):  [facility]     Platform used for Video Visit: Ian Chin MD/PhD       Reason for Consultation:  Telma Freed is a referred by Franny Beaver MD for evaluation and treatment of thrombocytopenia.     Assessment:  In summary, Telma Freed is a 20 year old woman with no significant past medical history who presented in 2019 after she was diagnosed with mono and developed thrombocytopenia to platelet count of <40K. She was noted to have thrombocytopenia since 2014 and personal history of easy bruising.      1). Chronic thrombocytopenia, likely ITP  2). Easy bruising with normal platelet aggregation and VWF evaluation  3). Low serum ferritin     During her initial evaluation in May 2019 she had a  personal history of easy bruising and had an ISTH bleeding assessment score of 5 (Jadetajessica BUSTOS et al. 2014 JTH), in female patients a score of <6 adult female is considered normal. The biggest caveat is that this score is weighed toward VWD not platelet disorders AND patient has not gone through extreme stressors (childbirth, major surgery).     Her mother also has history of easy bruising and heavy menstrual bleeding- raising concern for an inheirted disorder. Her evaluation, " which included PFA, VWF screening and platelet aggregometry was normal. She continued to have platelet count of 120. Her peripheral blood has normal platelet morphology, making macrothrombocytopenia less likely. Repeat VWF testing including collagen binding, propeptide and 2N testing remained normal. At present I believe a VWF diagnosis is unlikely.    Regarding her thrombocytopenia, her likely diagnosis is ITP. She does have presentation with menorrhagia, severe petechiae as well as ecchymosis that is NOT typical for her platelet levels. There have been treated with use of antifibrinolytics. It is possible that Telma may have either a collagen defect (William Danlos) or fibrinolytic disorder. She has started an IUD and will determine if this helps with menorrhagia. Counseled patient that concurrent use of antifibrinolytics and IUD is warranted in her case.     Regarding her low ferritin- I will have patient start an iron tablet or pre- vitamin. If this remains low can consider iron infusion in the spring.     Telma is instructed to contact clinic if bruising or new symptoms worsen.       Plan:  1. Majority of today's visit was spent counseling the patient regarding thrombocytopenia.  2.   Orders Placed This Encounter   Procedures     CBC with platelets differential     Ferritin     Iron and iron binding capacity     Reticulocyte count     Comprehensive metabolic panel   3. Start prenatal vitamin (for iron) or iron supplement  4. Continue with amicar 1 gram PRN post- runs  5. Contact clinic if symptoms worsen    The patient is given our center's contact information and is instructed to call if she should have any further questions or concerns.  Otherwise, we will plan on seeing her back Follow up with Provider - 6 months or sooner if needed      Yin Chin MD/PhD   of Medicine  [facility] [employer]      ----------------------------------------------------------------------------------------------------------------------     Interval History:  Telma Freed is a 20 year old woman who first presented in May 2019 in consultation due to chronic thrombocytopenia (platelets ~120) with recent worsening due to mononucleosis and lifelong history of easy bruising. Please refer to my initial consult note for further details.     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 No more petchiae. Got Mirena IUD on 8/24/2020.  Has been told to not take amicar while on IUD. Has been taking amicar three times a week after runs. Has skipped it after a run and not had more petchiae, just smaller bruises. May be from her puppy hitting her. Patient states she had heavy bleeding for 1.5 after it was implanted. No bleeding yet.       Past Medical History:  EBV Mono     Past Surgical History:  Adenoids     Medications:  Current Outpatient Medications   Medication     Aminocaproic Acid (AMICAR) 1000 MG TABS     levocetirizine (XYZAL) 5 MG tablet     levonorgestrel (MIRENA) 20 MCG/24HR IUD     FLUZONE QUADRIVALENT 0.5 ML injection     GIANVI 3-0.02 MG tablet     No current facility-administered medications for this visit.         Allergies:       Allergies   Allergen Reactions     Seasonal Allergies Hives        ROS:  A 7 point ROS is negative except as stated in the HPI     Social History:  Denies any tobacco use. Drinks alcohol on weekends. Abstained during her acute illness with mono. Denies any illicit drug use. 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     Family History:  Mother with easy bruising, heavy bleeding   Younger brother with no bleeding history per patient  Maternal grandmother  Maternal grandfather  Maternal aunt     Patient is not aware of her father's family history    Objective:  Wt 66.7 kg (147 lb)   BMI 23.02 kg/m      GENERAL: Healthy, alert and no  distress  EYES: Eyes grossly normal to inspection.  No discharge or erythema, or obvious scleral/conjunctival abnormalities.  RESP: No audible wheeze, cough, or visible cyanosis.  No visible retractions or increased work of breathing.    SKIN: Visible skin clear. Patient stood up and demonstrated large patches of ecchymosis over bilateral hips and on extensor forearms. No other significant rash, abnormal pigmentation or lesions.  NEURO: Cranial nerves grossly intact.  Mentation and speech appropriate for age.  PSYCH: Mentation appears normal, affect normal/bright, judgement and insight intact, normal speech and appearance well-groomed.    The rest of a comprehensive physical examination is deferred due to [facility] video/telephone visit restrictions     Labs: personally reviewed with relevant trends annotated below    Bleeding evaluation  The von Willebrand factor antigen (VWF:Ag) was 124% (range %), von Willebrand factor activity (VWF:ACT) was 122% (range %), and Factor 8 levels are within normal limits (130%, range %). Ristocetin Cofactor Activity (VWF:RCo) is normal at 79% (range %). The Factor 8  to VWF:Ag ratio and the VWF:ACT to VWF:Ag ratio are within normal limits.     The  VWF:RCo to VWF:Ag ratio is decreased/borderline low normal.       The distribution of plasma von Willebrand factor multimers is normal (see report from [employer]).     In light of the presence of a  decreased/borderline low normal VWF:RCo  to VWF:Ag ratio, presence of  a Type 2 variant (e.g. Type 2M) and some allele variants not associated with bleeding (Flood et al. Blood 2010;116:280-6 and Flood et al. Blood 2013;121:3742-4) can not be excluded.     VWF propeptide- normal  Collagen binding- normal  Platelet antibodies- negative platelet associated IgG antibodies      Platelet aggregometry  Arachadonic Acid  See table below     Comment: (Note)   COMMENTS:                       Normal platelet  aggregation study.  The patient is noted to have mild   thrombocytopenia and the platelet count of the platelet rich plasma   (PRP) used for testing is mildly decreased. Platelet aggregation   reference ranges have been established for a platelet count of   250x10^9/L in the PRP.  Due to thrombocytopenia in the PRP,   aggregation tracings are interpreted based on ranges reported in   literature (Khai et al. Thromb Haemost 2008;100:134-145). Maximal   platelet aggregation is within normal limits with ADP, Epinephrine,   Collagen, Arachidonic Acid, and low and high doses of Ristocetin.   ATP release is within normal limits with ADP and Thrombin. These   findings are non-diagnostic for a platelet function disorder. If a   platelet function disorder is suspected, consider repeat testing and   electron microscopy.                                       Brandee Medeiros M.D. [phone]                       6/26/2019                                   ATP RELEASE:             ATP Release with ADP:          Normal, 0.87 nm       ATP Release with Thrombin:     Normal, 1.32 nm       AGGREGATION:   Reagent  Concentration    Primary Agg         Maximal Agg   ADP      5 micromolar     Single large wave   Normal, 77%   EPI      10 micromolar    Present             Normal, 72%   COL      2.0 mcg/mL       -------             Normal, 90%   Ar. A.   0.50 mg/mL       -------             Normal, 71%   REFERENCE RANGES:   ATP release with TBN  Greater than or equal to 0.50 nm   ATP release with ADP  Greater than or equal to 0.40 nm   ADP % Agg             Greater than or equal to 64%   EPI % Agg             Greater than or equal to 63%   COL % Agg             Greater than or equal to 66%   AA  % Agg             Greater than or equal to 63%    Ristocetin-4 Conc  (Note)     Comment: COMMENTS:                       See full platelet aggregation comments.                       Brandee Medeiros M.D. [phone]                        6/26/2019                         RISTOCETIN:   Concentration    Primary Agg        Maximal Agg   0.50 mg/mL       Absent             Normal, 4%     1.00 mg/mL       Present            Normal, 83%   1.25 mg/mL       Single large wave  Normal, 91%   REFERENCE RANGES:   Ristocetin Conc.  % Aggregation   Risto 0.5 mg/mL   Less than or equal to 6%   Risto 1.0 mg/mL   Greater than or equal to 11%   Risto 1.25 mg/mL  Greater than or equal to 76%      Hepatitis B surface and core antibodies were nonreactive  CMV IgG negative  HIV and Hepatitis C negative    EBV DNA was positive with 19,473 DNA copies/mL-- consistent with history of mono  Pargovirus B19 IgG positive, IgM negative    Yin Chin MD

## 2020-09-23 NOTE — PROGRESS NOTES
Patient was contacted to complete the pre-visit call prior to their video visit with the provider.  The following statement was read:       This visit will be billed to your insurance the same as an in-person visit. Because of Coronavirus we are instituting video visits when possible to keep everyone safe. This video visit will be conducted between you and the provider.  This service lets us provide the care you need with a video conversation.  If a prescription is necessary, we can send it directly to your pharmacy.If lab work or other testing is needed, we can help arrange a place/time for that to be done at a later date.If during the course of the call the provider feels a video visit is not appropriate, then your insurance company will not be billed.       Allergies and medications were reviewed and travel screening complete.     A test connection was Completed with Pt? No    I thanked them for their time to cover this information     Ethan Carroll CMA

## 2021-01-03 ENCOUNTER — HEALTH MAINTENANCE LETTER (OUTPATIENT)
Age: 21
End: 2021-01-03

## 2021-01-25 ENCOUNTER — DOCUMENTATION ONLY (OUTPATIENT)
Dept: HEMATOLOGY | Facility: CLINIC | Age: 21
End: 2021-01-25

## 2021-03-03 LAB — LAB SCANNED RESULT: NORMAL

## 2021-04-25 ENCOUNTER — HEALTH MAINTENANCE LETTER (OUTPATIENT)
Age: 21
End: 2021-04-25

## 2021-05-05 ENCOUNTER — OFFICE VISIT (OUTPATIENT)
Dept: HEMATOLOGY | Facility: CLINIC | Age: 21
End: 2021-05-05
Attending: INTERNAL MEDICINE
Payer: COMMERCIAL

## 2021-05-05 VITALS
HEIGHT: 67 IN | BODY MASS INDEX: 23.78 KG/M2 | SYSTOLIC BLOOD PRESSURE: 125 MMHG | OXYGEN SATURATION: 99 % | HEART RATE: 70 BPM | DIASTOLIC BLOOD PRESSURE: 76 MMHG | TEMPERATURE: 98.1 F | WEIGHT: 151.5 LBS | RESPIRATION RATE: 14 BRPM

## 2021-05-05 DIAGNOSIS — E61.1 IRON DEFICIENCY: ICD-10-CM

## 2021-05-05 DIAGNOSIS — D69.3 IDIOPATHIC THROMBOCYTOPENIA (H): ICD-10-CM

## 2021-05-05 LAB
ALBUMIN SERPL-MCNC: 4.1 G/DL (ref 3.4–5)
ALP SERPL-CCNC: 72 U/L (ref 40–150)
ALT SERPL W P-5'-P-CCNC: 19 U/L (ref 0–50)
ANION GAP SERPL CALCULATED.3IONS-SCNC: 10 MMOL/L (ref 3–14)
AST SERPL W P-5'-P-CCNC: 25 U/L (ref 0–45)
BASOPHILS # BLD AUTO: 0.1 10E9/L (ref 0–0.2)
BASOPHILS NFR BLD AUTO: 1 %
BILIRUB SERPL-MCNC: 1.2 MG/DL (ref 0.2–1.3)
BUN SERPL-MCNC: 8 MG/DL (ref 7–30)
CALCIUM SERPL-MCNC: 9.3 MG/DL (ref 8.5–10.1)
CHLORIDE SERPL-SCNC: 108 MMOL/L (ref 94–109)
CO2 SERPL-SCNC: 21 MMOL/L (ref 20–32)
CREAT SERPL-MCNC: 0.66 MG/DL (ref 0.52–1.04)
DIFFERENTIAL METHOD BLD: ABNORMAL
EOSINOPHIL # BLD AUTO: 0.1 10E9/L (ref 0–0.7)
EOSINOPHIL NFR BLD AUTO: 1.9 %
ERYTHROCYTE [DISTWIDTH] IN BLOOD BY AUTOMATED COUNT: 12.1 % (ref 10–15)
FERRITIN SERPL-MCNC: 27 NG/ML (ref 12–150)
GFR SERPL CREATININE-BSD FRML MDRD: >90 ML/MIN/{1.73_M2}
GLUCOSE SERPL-MCNC: 91 MG/DL (ref 70–99)
HCT VFR BLD AUTO: 43.1 % (ref 35–47)
HGB BLD-MCNC: 14.4 G/DL (ref 11.7–15.7)
IMM GRANULOCYTES # BLD: 0 10E9/L (ref 0–0.4)
IMM GRANULOCYTES NFR BLD: 0.2 %
IRON SATN MFR SERPL: 65 % (ref 15–46)
IRON SERPL-MCNC: 219 UG/DL (ref 35–180)
LYMPHOCYTES # BLD AUTO: 2.3 10E9/L (ref 0.8–5.3)
LYMPHOCYTES NFR BLD AUTO: 46.7 %
MCH RBC QN AUTO: 31.3 PG (ref 26.5–33)
MCHC RBC AUTO-ENTMCNC: 33.4 G/DL (ref 31.5–36.5)
MCV RBC AUTO: 94 FL (ref 78–100)
MISCELLANEOUS TEST: NORMAL
MONOCYTES # BLD AUTO: 0.5 10E9/L (ref 0–1.3)
MONOCYTES NFR BLD AUTO: 10.2 %
NEUTROPHILS # BLD AUTO: 1.9 10E9/L (ref 1.6–8.3)
NEUTROPHILS NFR BLD AUTO: 40 %
NRBC # BLD AUTO: 0 10*3/UL
NRBC BLD AUTO-RTO: 0 /100
PLATELET # BLD AUTO: 128 10E9/L (ref 150–450)
POTASSIUM SERPL-SCNC: 4.2 MMOL/L (ref 3.4–5.3)
PROT SERPL-MCNC: 7.5 G/DL (ref 6.8–8.8)
RBC # BLD AUTO: 4.6 10E12/L (ref 3.8–5.2)
RETICS # AUTO: 48.8 10E9/L (ref 25–95)
RETICS/RBC NFR AUTO: 1.1 % (ref 0.5–2)
SODIUM SERPL-SCNC: 139 MMOL/L (ref 133–144)
TIBC SERPL-MCNC: 339 UG/DL (ref 240–430)
WBC # BLD AUTO: 4.8 10E9/L (ref 4–11)

## 2021-05-05 PROCEDURE — 99214 OFFICE O/P EST MOD 30 MIN: CPT | Performed by: INTERNAL MEDICINE

## 2021-05-05 PROCEDURE — 85045 AUTOMATED RETICULOCYTE COUNT: CPT | Performed by: INTERNAL MEDICINE

## 2021-05-05 PROCEDURE — 83550 IRON BINDING TEST: CPT | Performed by: INTERNAL MEDICINE

## 2021-05-05 PROCEDURE — 82728 ASSAY OF FERRITIN: CPT | Performed by: INTERNAL MEDICINE

## 2021-05-05 PROCEDURE — 80053 COMPREHEN METABOLIC PANEL: CPT | Performed by: INTERNAL MEDICINE

## 2021-05-05 PROCEDURE — G0463 HOSPITAL OUTPT CLINIC VISIT: HCPCS

## 2021-05-05 PROCEDURE — 36415 COLL VENOUS BLD VENIPUNCTURE: CPT | Performed by: INTERNAL MEDICINE

## 2021-05-05 PROCEDURE — 83540 ASSAY OF IRON: CPT | Performed by: INTERNAL MEDICINE

## 2021-05-05 PROCEDURE — 85025 COMPLETE CBC W/AUTO DIFF WBC: CPT | Performed by: INTERNAL MEDICINE

## 2021-05-05 PROCEDURE — 88348 ELECTRON MICROSCOPY DX: CPT | Performed by: INTERNAL MEDICINE

## 2021-05-05 PROCEDURE — 84999 UNLISTED CHEMISTRY PROCEDURE: CPT | Performed by: INTERNAL MEDICINE

## 2021-05-05 RX ORDER — TRANEXAMIC ACID 650 MG/1
1300 TABLET ORAL 2 TIMES DAILY
Qty: 120 TABLET | Refills: 1 | Status: SHIPPED | OUTPATIENT
Start: 2021-05-05 | End: 2021-07-02

## 2021-05-05 ASSESSMENT — MIFFLIN-ST. JEOR: SCORE: 1479.83

## 2021-05-05 ASSESSMENT — PAIN SCALES - GENERAL: PAINLEVEL: NO PAIN (0)

## 2021-05-05 NOTE — PROGRESS NOTES
Reason for Consultation:  Telma Freed is a referred by Franny Beaver MD for evaluation and treatment of thrombocytopenia.     Assessment:  In summary, Telma Freed is a 20 year old woman with no significant past medical history who presented in 2019 after she was diagnosed with mono and developed thrombocytopenia to platelet count of <40K. She was noted to have thrombocytopenia since 2014 and personal history of easy bruising.      1). Chronic thrombocytopenia, likely ITP  2). Easy bruising with normal platelet aggregation and VWF evaluation  3). Low serum ferritin     During her initial evaluation in May 2019 she had a  personal history of easy bruising and had an ISTH bleeding assessment score of 5 (Clara BUSTOS et al. 2014 JTH), in female patients a score of <6 adult female is considered normal. The biggest caveat is that this score is weighed toward VWD not platelet disorders AND patient has not gone through extreme stressors (childbirth, major surgery).     Her mother also has history of easy bruising and heavy menstrual bleeding- raising concern for an inheirted disorder. Her evaluation, which included PFA, VWF screening and platelet aggregometry was normal. She continued to have platelet count in 120 range. Her peripheral blood has normal platelet morphology, making macrothrombocytopenia less likely. Repeat VWF testing including collagen binding, propeptide and 2N testing remained normal. At present I believe a VWF diagnosis is unlikely. I will send platelet electron microscopy    Regarding her thrombocytopenia, her likely diagnosis is ITP. She does have presentation with menorrhagia, severe petechiae as well as ecchymosis that is NOT typical for her platelet levels. There have been treated with use of antifibrinolytics. It is possible that Telma may have either a collagen defect (William Danlos) or fibrinolytic disorder. Will refer her to our Gyn group for other options. I will also  change from amicar to tranexamic acid. Will follow up with titration.     Regarding her low ferritin- improved with oral supplementation from 10 to 27. Serum iron elevated- may reflect supplement taken prior to draw. Will repeat at next visit with instructions to not take oral iron prior to draw.     Telma is instructed to contact clinic if bruising or new symptoms worsen.       Plan:  1. Majority of today's visit was spent counseling the patient regarding thrombocytopenia.  2.   Orders Placed This Encounter   Procedures     CBC with platelets differential     [id number] platelet electron microscopy: Laboratory Miscellaneous Order     Iron and iron binding capacity     Reticulocyte count     Ferritin     [facility] Miscellaneous Test     OB/GYN REFERRAL   3. Continue prenatal vitamin (for iron) or iron supplement  -- IV iron  4. Change to Tranexamic acid 1300 mg BID  5. Contact clinic if symptoms worsen    The patient is given our center's contact information and is instructed to call if she should have any further questions or concerns.  Otherwise, we will plan on seeing her back Follow up with Provider - 3 months or sooner if needed      Yin Chin MD/PhD   of Medicine  [facility] [employer]     ----------------------------------------------------------------------------------------------------------------------     Interval History:  Telma Freed is a 20 year old woman who first presented in May 2019 in consultation due to chronic thrombocytopenia (platelets ~120) with recent worsening due to mononucleosis and lifelong history of easy bruising. Please refer to my initial consult note for further details.     Telma reports that she is having long periods or episodes of bleeding. Will have issues after activity. Not predictable. Continues to have severe bruising. No nosebleeds. Per mother patient is quite fatigued.      Past Medical History:  EBV Mono     Past Surgical  "History:  Adenoids     Medications:  Current Outpatient Medications   Medication     Aminocaproic Acid (AMICAR) 1000 MG TABS     FLUZONE QUADRIVALENT 0.5 ML injection     levocetirizine (XYZAL) 5 MG tablet     levonorgestrel (MIRENA) 20 MCG/24HR IUD     No current facility-administered medications for this visit.         Allergies:       Allergies   Allergen Reactions     Seasonal Allergies Hives        ROS:  A 7 point ROS is negative except as stated in the HPI     Social History:  Denies any tobacco use. Drinks alcohol on weekends. Abstained during her acute illness with mono. Denies any illicit drug use. 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  No discharge or erythema, or obvious scleral/conjunctival abnormalities.  RESP: No audible wheeze, cough, or visible cyanosis.  No visible retractions or increased work of breathing.    SKIN: Visible skin clear. No other significant rash, abnormal pigmentation or lesions.  NEURO: Cranial nerves grossly intact.  Mentation and speech appropriate for age.  PSYCH: Mentation appears normal, affect normal/bright, judgement and insight intact, normal speech and appearance well-groomed.      Labs: personally reviewed with relevant trends annotated below    Bleeding evaluation  The von Willebrand factor antigen (VWF:Ag) was 124% (range %), von Willebrand factor activity (VWF:ACT) was 122% (range %), and " Factor 8 levels are within normal limits (130%, range %). Ristocetin Cofactor Activity (VWF:RCo) is normal at 79% (range %). The Factor 8  to VWF:Ag ratio and the VWF:ACT to VWF:Ag ratio are within normal limits.     The  VWF:RCo to VWF:Ag ratio is decreased/borderline low normal.       The distribution of plasma von Willebrand factor multimers is normal (see report from [employer]).     In light of the presence of a  decreased/borderline low normal VWF:RCo  to VWF:Ag ratio, presence of  a Type 2 variant (e.g. Type 2M) and some allele variants not associated with bleeding (Flood et al. Blood 2010;116:280-6 and Flood et al. Blood 2013;121:3742-4) can not be excluded.     VWF propeptide- normal  Collagen binding- normal  Platelet antibodies- negative platelet associated IgG antibodies      Platelet aggregometry  Arachadonic Acid  See table below     Comment: (Note)   COMMENTS:                       Normal platelet aggregation study.  The patient is noted to have mild   thrombocytopenia and the platelet count of the platelet rich plasma   (PRP) used for testing is mildly decreased. Platelet aggregation   reference ranges have been established for a platelet count of   250x10^9/L in the PRP.  Due to thrombocytopenia in the PRP,   aggregation tracings are interpreted based on ranges reported in   literature (Khai et al. Thromb Haemost 2008;100:134-145). Maximal   platelet aggregation is within normal limits with ADP, Epinephrine,   Collagen, Arachidonic Acid, and low and high doses of Ristocetin.   ATP release is within normal limits with ADP and Thrombin. These   findings are non-diagnostic for a platelet function disorder. If a   platelet function disorder is suspected, consider repeat testing and   electron microscopy.                                       Brandee Medeiros M.D. [phone]                       6/26/2019                                   ATP RELEASE:             ATP Release with  ADP:          Normal, 0.87 nm       ATP Release with Thrombin:     Normal, 1.32 nm       AGGREGATION:   Reagent  Concentration    Primary Agg         Maximal Agg   ADP      5 micromolar     Single large wave   Normal, 77%   EPI      10 micromolar    Present             Normal, 72%   COL      2.0 mcg/mL       -------             Normal, 90%   Ar. A.   0.50 mg/mL       -------             Normal, 71%   REFERENCE RANGES:   ATP release with TBN  Greater than or equal to 0.50 nm   ATP release with ADP  Greater than or equal to 0.40 nm   ADP % Agg             Greater than or equal to 64%   EPI % Agg             Greater than or equal to 63%   COL % Agg             Greater than or equal to 66%   AA  % Agg             Greater than or equal to 63%    Ristocetin-4 Conc  (Note)     Comment: COMMENTS:                       See full platelet aggregation comments.                       Brandee Medeiros M.D. [phone]                       6/26/2019                         RISTOCETIN:   Concentration    Primary Agg        Maximal Agg   0.50 mg/mL       Absent             Normal, 4%     1.00 mg/mL       Present            Normal, 83%   1.25 mg/mL       Single large wave  Normal, 91%   REFERENCE RANGES:   Ristocetin Conc.  % Aggregation   Risto 0.5 mg/mL   Less than or equal to 6%   Risto 1.0 mg/mL   Greater than or equal to 11%   Risto 1.25 mg/mL  Greater than or equal to 76%      Hepatitis B surface and core antibodies were nonreactive  CMV IgG negative  HIV and Hepatitis C negative    EBV DNA was positive with 19,473 DNA copies/mL-- consistent with history of mono  Pargovirus B19 IgG positive, IgM negative    CBC RESULTS:   Recent Labs   Lab Test 05/05/21  1141   WBC 4.8   RBC 4.60   HGB 14.4   HCT 43.1   MCV 94   MCH 31.3   MCHC 33.4   RDW 12.1   *     Ferritin   Date Value Ref Range Status   05/05/2021 27 12 - 150 ng/mL Final     Iron   Date Value Ref Range Status   05/05/2021 219 (H) 35 - 180 ug/dL Final     Iron  Binding Cap   Date Value Ref Range Status   05/05/2021 339 240 - 430 ug/dL Final

## 2021-05-05 NOTE — PATIENT INSTRUCTIONS
Stop Amicar  Start Tranexamic Acid    Schedule IV iron  Ferric carboxymaltose (Injectafer) and Ferumoxytol (Ferraheme) injections.   Benefit: These preparations allow for 500-750 mg of iron to be adminsitered over 10-15 m. The risk of anaphylaxis is decreasaed, so no need for premedications.   Downside: In order to replete total body iron, patient would need to come to infusion center for 2-3 visits.   How it is given: injection in infusion center followed by monitoring. Total time ~1-2 h.        Labs today.       We may consider DDAVP trial   I will contact [facility]     Yin Chin MD/PhD   of Medicine  Division of Hematology, Oncology and Transplantation

## 2021-05-17 LAB
RESULT: NORMAL
SEND OUTS MISC TEST CODE: NORMAL
SEND OUTS MISC TEST SPECIMEN: NORMAL
TEST NAME: NORMAL

## 2021-05-29 ASSESSMENT — ANXIETY QUESTIONNAIRES
4. TROUBLE RELAXING: SEVERAL DAYS
7. FEELING AFRAID AS IF SOMETHING AWFUL MIGHT HAPPEN: NOT AT ALL
5. BEING SO RESTLESS THAT IT IS HARD TO SIT STILL: NOT AT ALL
6. BECOMING EASILY ANNOYED OR IRRITABLE: NOT AT ALL
3. WORRYING TOO MUCH ABOUT DIFFERENT THINGS: NOT AT ALL
GAD7 TOTAL SCORE: 1
1. FEELING NERVOUS, ANXIOUS, OR ON EDGE: NOT AT ALL
7. FEELING AFRAID AS IF SOMETHING AWFUL MIGHT HAPPEN: NOT AT ALL
GAD7 TOTAL SCORE: 1
2. NOT BEING ABLE TO STOP OR CONTROL WORRYING: NOT AT ALL

## 2021-05-29 ASSESSMENT — ENCOUNTER SYMPTOMS
HOT FLASHES: 0
BRUISES/BLEEDS EASILY: 1
DECREASED LIBIDO: 0
SWOLLEN GLANDS: 0

## 2021-05-30 ASSESSMENT — ANXIETY QUESTIONNAIRES: GAD7 TOTAL SCORE: 1

## 2021-05-31 NOTE — PROGRESS NOTES
"[facility]  Gynecology Visit    Reason for Consult: Iron deficiency  Consulting Provider:  Yin Chin MD          HPI:    Telma Freed is a 20 year old G0 here for referral for iron deficiency and heavy menstrual bleeding.  Pt reports she is seeking a second opinion as she has overall been unhappy with Mirena IUD. Reports intense pain with insertion and for several days after.  Was previously on OCPs with  Menses lasting 7-9 days and very heavy. Now taking lysteda with mirena and in fact last period lighter. Still lasting 7-9 days and sometimes shorter interval of 2-2.5 weeks in between. No pain with menses but pain at time of ovulation as well as white/clear discharge.       GYN History  - Menses: Patient's last menstrual period was 05/27/2021.  - See primary Ob/Gyn for pap smears and declines STI testing today    OBHx  OB History   No obstetric history on file.       PMHx: Suspected ITP, previously with hypertension during divorce of parents (now resolved)    Meds:   Current Outpatient Medications   Medication     levocetirizine (XYZAL) 5 MG tablet     levonorgestrel (MIRENA) 20 MCG/24HR IUD     tranexamic acid (LYSTEDA) 650 MG tablet     No current facility-administered medications for this visit.        Allergies:       Allergies   Allergen Reactions     Seasonal Allergies Hives         SocHx: Reports 1 drink alcohol/week. No tobacco or drug use.  Sexually active.    FamHx:  Denies family history of clotting disorders.  Reports parents with hypertension.    ROS: 10-Point ROS negative except as noted in HPI    Physical Exam  /71   Pulse 60   Ht 1.702 m (5' 7\")   Wt 68.9 kg (152 lb)   LMP 05/27/2021   Breastfeeding No   BMI 23.81 kg/m    Body mass index is 23.81 kg/m .  Gen: Well-appearing, NAD  HEENT: Normocephalic, atraumatic  CV:  Regular rate  Pulm: Unlabored breathing      Assessment/Plan:  Telma Freed is a 20 year old female here for referral regarding iron deficiency and " history of heavy menstrual bleeding in setting of suspected ITP.    Heavy menstrual bleeding  - s/p hematology consultation and thorough evaluation, VWD very unlikely but findings suggestive of ITP  - currently on TXA, PO iron  - discussed menstrual suppression options including continuing mirena IUD.    - consider addition of combined OCP taking in continuous fashion. Pt would like to give it another 2 months   - scheduled tylenol and heat packs with ovulation and menses  - pt reports s/p pelvic US showing normal adnexa and IUD in appropriate location. If any questions of change in symptoms recommend repeat pelvic US to ensure proper IUD location and evaluate for ovarian cysts    Health maintenance  - per primary Ob/Gyn    Discussed with Dr. Castro.      Amy Schumer, MD  Obstetrics and Gynecology PGY-4  6/1/21    I agree with note as above. The patient was seen in continuity clinic by the resident doctor.  Assessment and plan were jointly made.  Funmi Castro MD      Answers for HPI/ROS submitted by the patient on 5/29/2021   BRUNA 7 TOTAL SCORE: 1  General Symptoms: No  Skin Symptoms: No  HENT Symptoms: No  EYE SYMPTOMS: No  HEART SYMPTOMS: No  LUNG SYMPTOMS: No  INTESTINAL SYMPTOMS: No  URINARY SYMPTOMS: No  GYNECOLOGIC SYMPTOMS: Yes  BREAST SYMPTOMS: No  SKELETAL SYMPTOMS: No  BLOOD SYMPTOMS: Yes  NERVOUS SYSTEM SYMPTOMS: No  MENTAL HEALTH SYMPTOMS: No  Anemia: Yes  Swollen glands: No  Easy bleeding or bruising: Yes  Edema or swelling: No  Bleeding or spotting between periods: Yes  Heavy or painful periods: Yes  Irregular periods: Yes  Vaginal discharge: Yes  Hot flashes: No  Vaginal dryness: No  Genital ulcers: No  Reduced libido: No  Painful intercourse: No  Difficulty with sexual arousal: No  Post-menopausal bleeding: No

## 2021-06-01 ENCOUNTER — OFFICE VISIT (OUTPATIENT)
Dept: OBGYN | Facility: CLINIC | Age: 21
End: 2021-06-01
Attending: OBSTETRICS & GYNECOLOGY
Payer: COMMERCIAL

## 2021-06-01 VITALS
HEIGHT: 67 IN | DIASTOLIC BLOOD PRESSURE: 71 MMHG | HEART RATE: 60 BPM | WEIGHT: 152 LBS | SYSTOLIC BLOOD PRESSURE: 127 MMHG | BODY MASS INDEX: 23.86 KG/M2

## 2021-06-01 DIAGNOSIS — Z01.419 ENCOUNTER FOR GYNECOLOGICAL EXAMINATION WITHOUT ABNORMAL FINDING: ICD-10-CM

## 2021-06-01 DIAGNOSIS — E61.1 IRON DEFICIENCY: ICD-10-CM

## 2021-06-01 DIAGNOSIS — Z12.4 SCREENING FOR MALIGNANT NEOPLASM OF CERVIX: ICD-10-CM

## 2021-06-01 PROCEDURE — G0463 HOSPITAL OUTPT CLINIC VISIT: HCPCS

## 2021-06-01 PROCEDURE — 99202 OFFICE O/P NEW SF 15 MIN: CPT | Mod: GE | Performed by: OBSTETRICS & GYNECOLOGY

## 2021-06-01 ASSESSMENT — ANXIETY QUESTIONNAIRES
3. WORRYING TOO MUCH ABOUT DIFFERENT THINGS: NOT AT ALL
7. FEELING AFRAID AS IF SOMETHING AWFUL MIGHT HAPPEN: NOT AT ALL
6. BECOMING EASILY ANNOYED OR IRRITABLE: NOT AT ALL
7. FEELING AFRAID AS IF SOMETHING AWFUL MIGHT HAPPEN: NOT AT ALL
6. BECOMING EASILY ANNOYED OR IRRITABLE: NOT AT ALL
2. NOT BEING ABLE TO STOP OR CONTROL WORRYING: NOT AT ALL
1. FEELING NERVOUS, ANXIOUS, OR ON EDGE: NOT AT ALL
2. NOT BEING ABLE TO STOP OR CONTROL WORRYING: NOT AT ALL
3. WORRYING TOO MUCH ABOUT DIFFERENT THINGS: NOT AT ALL
GAD7 TOTAL SCORE: 0
GAD7 TOTAL SCORE: 1
IF YOU CHECKED OFF ANY PROBLEMS ON THIS QUESTIONNAIRE, HOW DIFFICULT HAVE THESE PROBLEMS MADE IT FOR YOU TO DO YOUR WORK, TAKE CARE OF THINGS AT HOME, OR GET ALONG WITH OTHER PEOPLE: NOT DIFFICULT AT ALL
5. BEING SO RESTLESS THAT IT IS HARD TO SIT STILL: NOT AT ALL
1. FEELING NERVOUS, ANXIOUS, OR ON EDGE: NOT AT ALL
5. BEING SO RESTLESS THAT IT IS HARD TO SIT STILL: NOT AT ALL

## 2021-06-01 ASSESSMENT — PAIN SCALES - GENERAL: PAINLEVEL: NO PAIN (0)

## 2021-06-01 ASSESSMENT — PATIENT HEALTH QUESTIONNAIRE - PHQ9
SUM OF ALL RESPONSES TO PHQ QUESTIONS 1-9: 0
5. POOR APPETITE OR OVEREATING: NOT AT ALL
5. POOR APPETITE OR OVEREATING: SEVERAL DAYS

## 2021-06-01 ASSESSMENT — MIFFLIN-ST. JEOR: SCORE: 1482.1

## 2021-06-01 NOTE — LETTER
"6/1/2021       RE: Telma Freed  [address] [place] [address]     Dear Colleague,    Thank you for referring your patient, Telma Freed, to the [facility] [place] at [facility]. Please see a copy of my visit note below.    [facility]  Gynecology Visit    Reason for Consult: Iron deficiency  Consulting Provider:  Yin Chin MD          HPI:    Telma Freed is a 20 year old G0 here for referral for iron deficiency and heavy menstrual bleeding.  Pt reports she is seeking a second opinion as she has overall been unhappy with Mirena IUD. Reports intense pain with insertion and for several days after.  Was previously on OCPs with  Menses lasting 7-9 days and very heavy. Now taking lysteda with mirena and in fact last period lighter. Still lasting 7-9 days and sometimes shorter interval of 2-2.5 weeks in between. No pain with menses but pain at time of ovulation as well as white/clear discharge.       GYN History  - Menses: Patient's last menstrual period was 05/27/2021.  - See primary Ob/Gyn for pap smears and declines STI testing today    OBHx  OB History   No obstetric history on file.       PMHx: Suspected ITP, previously with hypertension during divorce of parents (now resolved)    Meds:   Current Outpatient Medications   Medication     levocetirizine (XYZAL) 5 MG tablet     levonorgestrel (MIRENA) 20 MCG/24HR IUD     tranexamic acid (LYSTEDA) 650 MG tablet     No current facility-administered medications for this visit.        Allergies:       Allergies   Allergen Reactions     Seasonal Allergies Hives         SocHx: Reports 1 drink alcohol/week. No tobacco or drug use.  Sexually active.    FamHx:  Denies family history of clotting disorders.  Reports parents with hypertension.    ROS: 10-Point ROS negative except as noted in HPI    Physical Exam  /71   Pulse 60   Ht 1.702 m (5' 7\")   Wt 68.9 kg (152 lb) "   LMP 05/27/2021   Breastfeeding No   BMI 23.81 kg/m    Body mass index is 23.81 kg/m .  Gen: Well-appearing, NAD  HEENT: Normocephalic, atraumatic  CV:  Regular rate  Pulm: Unlabored breathing      Assessment/Plan:  Telma Freed is a 20 year old female here for referral regarding iron deficiency and history of heavy menstrual bleeding in setting of suspected ITP.    Heavy menstrual bleeding  - s/p hematology consultation and thorough evaluation, VWD very unlikely but findings suggestive of ITP  - currently on TXA, PO iron  - discussed menstrual suppression options including continuing mirena IUD.    - consider addition of combined OCP taking in continuous fashion. Pt would like to give it another 2 months   - scheduled tylenol and heat packs with ovulation and menses  - pt reports s/p pelvic US showing normal adnexa and IUD in appropriate location. If any questions of change in symptoms recommend repeat pelvic US to ensure proper IUD location and evaluate for ovarian cysts    Health maintenance  - per primary Ob/Gyn    Discussed with Dr. Castro.      Amy Schumer, MD  Obstetrics and Gynecology PGY-4  6/1/21    I agree with note as above. The patient was seen in continuity clinic by the resident doctor.  Assessment and plan were jointly made.  Funmi Castro MD      Answers for HPI/ROS submitted by the patient on 5/29/2021   BRUNA 7 TOTAL SCORE: 1  General Symptoms: No  Skin Symptoms: No  HENT Symptoms: No  EYE SYMPTOMS: No  HEART SYMPTOMS: No  LUNG SYMPTOMS: No  INTESTINAL SYMPTOMS: No  URINARY SYMPTOMS: No  GYNECOLOGIC SYMPTOMS: Yes  BREAST SYMPTOMS: No  SKELETAL SYMPTOMS: No  BLOOD SYMPTOMS: Yes  NERVOUS SYSTEM SYMPTOMS: No  MENTAL HEALTH SYMPTOMS: No  Anemia: Yes  Swollen glands: No  Easy bleeding or bruising: Yes  Edema or swelling: No  Bleeding or spotting between periods: Yes  Heavy or painful periods: Yes  Irregular periods: Yes  Vaginal discharge: Yes  Hot flashes: No  Vaginal dryness: No  Genital  ulcers: No  Reduced libido: No  Painful intercourse: No  Difficulty with sexual arousal: No  Post-menopausal bleeding: No

## 2021-06-01 NOTE — NURSING NOTE
Chief Complaint   Patient presents with     [facility]   Jerilyn Daniels LPN

## 2021-07-02 DIAGNOSIS — E61.1 IRON DEFICIENCY: ICD-10-CM

## 2021-07-02 RX ORDER — TRANEXAMIC ACID 650 MG/1
1300 TABLET ORAL 2 TIMES DAILY
Qty: 120 TABLET | Refills: 1 | Status: SHIPPED | OUTPATIENT
Start: 2021-07-02 | End: 2021-09-16

## 2021-07-02 RX ORDER — TRANEXAMIC ACID 650 MG/1
1300 TABLET ORAL 2 TIMES DAILY
Qty: 120 TABLET | Refills: 1 | Status: SHIPPED | OUTPATIENT
Start: 2021-07-02 | End: 2021-07-02

## 2021-07-02 NOTE — TELEPHONE ENCOUNTER
Xavier Hollis is requesting a refill for    Drug: Tranexamic acid 650mg  Last Fill Date: 6/1  Last Fill Quantity: 120  Last Office Visit: 6/1    Thanks    Sandra Falcon, Pharm.D  Pharmacist in Charge  [facility]  [phone]

## 2021-09-05 ASSESSMENT — ANXIETY QUESTIONNAIRES
6. BECOMING EASILY ANNOYED OR IRRITABLE: NOT AT ALL
4. TROUBLE RELAXING: NOT AT ALL
1. FEELING NERVOUS, ANXIOUS, OR ON EDGE: NOT AT ALL
8. IF YOU CHECKED OFF ANY PROBLEMS, HOW DIFFICULT HAVE THESE MADE IT FOR YOU TO DO YOUR WORK, TAKE CARE OF THINGS AT HOME, OR GET ALONG WITH OTHER PEOPLE?: NOT DIFFICULT AT ALL
2. NOT BEING ABLE TO STOP OR CONTROL WORRYING: NOT AT ALL
7. FEELING AFRAID AS IF SOMETHING AWFUL MIGHT HAPPEN: NOT AT ALL
GAD7 TOTAL SCORE: 0
3. WORRYING TOO MUCH ABOUT DIFFERENT THINGS: NOT AT ALL
5. BEING SO RESTLESS THAT IT IS HARD TO SIT STILL: NOT AT ALL
7. FEELING AFRAID AS IF SOMETHING AWFUL MIGHT HAPPEN: NOT AT ALL
GAD7 TOTAL SCORE: 0
GAD7 TOTAL SCORE: 0

## 2021-09-06 ASSESSMENT — ANXIETY QUESTIONNAIRES: GAD7 TOTAL SCORE: 0

## 2021-09-07 NOTE — PROGRESS NOTES
[facility]  Gynecology Visit    SUBJECTIVE    Telma Freed is a 21 year old, , with PMH of ITP who presents to establish care and address contraceptive concerns.    Telma has very heavy menses because of ITP. In 2020 she had a Mirena IUD placed which has significantly improved bleeding, though she continues to need TXA in addition. However, despite an US in January which demonstrated proper placement, she has uterine cramping around her anticipated time of ovulation and with menstruation, and also gets premenstrual breast pain. She is frustrated by these ongoing symptoms and wonders what else she could do to manage her menses. She currently menstruates monthly for 3-5 days.    Currently sexually active with 1 partner, no concerns for STIs.     Her LMP is: No LMP recorded. (Menstrual status: IUD).       PAST MEDICAL HISTORY  Past Medical History:   Diagnosis Date     Bleeding disorder (H) 2019     Idiopathic thrombocytopenic purpura (H)        MEDICATIONS  Current Outpatient Medications   Medication     levocetirizine (XYZAL) 5 MG tablet     levonorgestrel (MIRENA) 20 MCG/24HR IUD     tranexamic acid (LYSTEDA) 650 MG tablet     No current facility-administered medications for this visit.       ALLERGIES  Allergies   Allergen Reactions     Seasonal Allergies Hives       OBSTETRIC/GYNECOLOGIC HISTORY  Currently sexually active with 1 partner. No concern for STIs.       OB History    Para Term  AB Living   0 0 0 0 0 0   SAB TAB Ectopic Multiple Live Births   0 0 0 0 0       PAST SURGICAL HISTORY   Past Surgical History:   Procedure Laterality Date     ENT SURGERY         SOCIAL HISTORY  Social History     Tobacco Use     Smoking status: Never Smoker     Smokeless tobacco: Never Used   Substance Use Topics     Alcohol use: Not on file     Drug use: Not on file     Social History     Social History Narrative     Not on file       FAMILY HISTORY  No family history on  "file.    REVIEW OF SYSTEMS  A 10 point review of systems including Constitutional, Eyes, Respiratory, Cardiovascular, Gastroenterology, Genitourinary, Integumentary, Musculoskeletal, and Psychiatric, were all negative, except for pertinent positives noted in the above HPI.    OBJECTIVE  /69 (BP Location: Right arm, Patient Position: Chair)   Pulse 73   Ht 1.702 m (5' 7\")   Wt 69.9 kg (154 lb 3.2 oz)   BMI 24.15 kg/m      General: Alert, without distress   Pelvic: normal external female genitalia; normal vagina without discharge; normal cervix without lesions/masses;  uterus small, mobile, nontender; adnexae nontender and without masses; normal anus/perineum   Extremities: normal    IUD Removal Procedure Note  Telma Freed and I discussed the risks and benefits removing the IUD, including infection, pain, bleeding, and incomplete/unsuccessful removal. She agreed to proceed.    A speculum was placed into the vagina. The strings of the Mirena IUD were grasped with a ring forcep and removed intact without difficulty. The cervix was hemostatic and the instruments removed from the vagina. She tolerated the procedure well.    Nexplanon Insertion Procedure Note    Telma Freed desired a Nexplanon  insertion. We discussed the risks/benefits of the procedure, including small scar on the arm, mild pain/bruising after the procedure, menstrual changes including irregular bleeding/spotting, and that the device is effective for 5 years and must be removed after that time.     The skin of the left upper arm 3 finger widths from the olecranon process was cleansed with iodine. 3 mL of 1% lidocaine was injected into the same arm and then the nexplanon device was inserted subdermally according to the 's instructions. Both myself and the patient felt the elise after placement. The site was dressed with steristrips, a bandaid, and kerlix. Lot [id number], exp 9/23.     Telma Freed tolerated the procedure " well.       ASSESSMENT  Telma Freed is a 21 year old, , with PMH of ITP who presents with breast pain and cramping in the setting of a Mirena IUD.    PLAN    Heavy menstrual bleeding secondary to ITP   Breast swelling & Cramping in setting of Mirena IUD   Patient's symptoms are correspond with ovulation and menstruation; discussed options to suppress cycle including oral contraceptives in addition to IUD to control bleeding vs a switch in hormonal contraceptive to a method that suppresses ovulation such as nexplanon or depo-provera. Patient expressed desire to not have to take daily medication in addition to TXA and opted for nexplanon insertion and IUD removal today.   -Nexplanon (etonogestrel) implant insertion performed today. May be removed at any time at Telma's request but is due for removal by 2028.   -Mirena IUD removal performed today, uncomplicated. No back up method of contraception needed given method switch. Reviewed use of barrier methods needed for STD protection.     Age 19-39 Annual Preventive Exam  Up to date on pap smear, has received HPV, flu, and COVID vaccinations.   -declined STD screening today    Telma will followup in 1 year for an annual examination unless concerns arise.       PEGGY MENDEZ, MS3  [employer]     OBGYN Attending Addendum    I appreciate the note above by medical student, Peggy Mendez. I, Josseline Montoya, was present with the medical student, who participated in the service and in the documentation of the note. I have verified the history and personally performed the physical exam and medical decision making. I have edited accordingly and agree with the assessment and plan of care as documented in the note.    Josseline Montoya MD, MSCI    [facility]/OBGYN  Date of Service: 21

## 2021-09-08 ENCOUNTER — OFFICE VISIT (OUTPATIENT)
Dept: OBGYN | Facility: CLINIC | Age: 21
End: 2021-09-08
Attending: OBSTETRICS & GYNECOLOGY
Payer: COMMERCIAL

## 2021-09-08 VITALS
HEART RATE: 73 BPM | DIASTOLIC BLOOD PRESSURE: 69 MMHG | SYSTOLIC BLOOD PRESSURE: 121 MMHG | BODY MASS INDEX: 24.2 KG/M2 | HEIGHT: 67 IN | WEIGHT: 154.2 LBS

## 2021-09-08 DIAGNOSIS — D69.3 IDIOPATHIC THROMBOCYTOPENIC PURPURA (H): ICD-10-CM

## 2021-09-08 DIAGNOSIS — Z30.017 NEXPLANON INSERTION: ICD-10-CM

## 2021-09-08 DIAGNOSIS — Z30.432 ENCOUNTER FOR IUD REMOVAL: ICD-10-CM

## 2021-09-08 DIAGNOSIS — Z30.09 ENCOUNTER FOR COUNSELING REGARDING CONTRACEPTION: Primary | ICD-10-CM

## 2021-09-08 PROCEDURE — 250N000011 HC RX IP 250 OP 636: Performed by: OBSTETRICS & GYNECOLOGY

## 2021-09-08 PROCEDURE — 58301 REMOVE INTRAUTERINE DEVICE: CPT | Performed by: OBSTETRICS & GYNECOLOGY

## 2021-09-08 PROCEDURE — 99213 OFFICE O/P EST LOW 20 MIN: CPT | Mod: 25 | Performed by: OBSTETRICS & GYNECOLOGY

## 2021-09-08 PROCEDURE — 11981 INSERTION DRUG DLVR IMPLANT: CPT | Performed by: OBSTETRICS & GYNECOLOGY

## 2021-09-08 PROCEDURE — G0463 HOSPITAL OUTPT CLINIC VISIT: HCPCS

## 2021-09-08 RX ADMIN — ETONOGESTREL 68 MG: 68 IMPLANT SUBCUTANEOUS at 15:10

## 2021-09-08 ASSESSMENT — ANXIETY QUESTIONNAIRES
5. BEING SO RESTLESS THAT IT IS HARD TO SIT STILL: NOT AT ALL
1. FEELING NERVOUS, ANXIOUS, OR ON EDGE: NOT AT ALL
3. WORRYING TOO MUCH ABOUT DIFFERENT THINGS: NOT AT ALL
7. FEELING AFRAID AS IF SOMETHING AWFUL MIGHT HAPPEN: NOT AT ALL
GAD7 TOTAL SCORE: 0
2. NOT BEING ABLE TO STOP OR CONTROL WORRYING: NOT AT ALL
6. BECOMING EASILY ANNOYED OR IRRITABLE: NOT AT ALL

## 2021-09-08 ASSESSMENT — MIFFLIN-ST. JEOR: SCORE: 1487.08

## 2021-09-08 ASSESSMENT — PATIENT HEALTH QUESTIONNAIRE - PHQ9: 5. POOR APPETITE OR OVEREATING: NOT AT ALL

## 2021-09-08 NOTE — LETTER
2021       RE: Telma Freed  [address] [place] [address]     Dear Colleague,    Thank you for referring your patient, Telma Freed, to the [facility] [place] at [facility]. Please see a copy of my visit note below.    [facility]  Gynecology Visit    SUBJECTIVE    Telma Freed is a 21 year old, , with PMH of ITP who presents to establish care and address contraceptive concerns.    Telma has very heavy menses because of ITP. In 2020 she had a Mirena IUD placed which has significantly improved bleeding, though she continues to need TXA in addition. However, despite an US in January which demonstrated proper placement, she has uterine cramping around her anticipated time of ovulation and with menstruation, and also gets premenstrual breast pain. She is frustrated by these ongoing symptoms and wonders what else she could do to manage her menses. She currently menstruates monthly for 3-5 days.    Currently sexually active with 1 partner, no concerns for STIs.     Her LMP is: No LMP recorded. (Menstrual status: IUD).       PAST MEDICAL HISTORY  Past Medical History:   Diagnosis Date     Bleeding disorder (H) 2019     Idiopathic thrombocytopenic purpura (H)        MEDICATIONS  Current Outpatient Medications   Medication     levocetirizine (XYZAL) 5 MG tablet     levonorgestrel (MIRENA) 20 MCG/24HR IUD     tranexamic acid (LYSTEDA) 650 MG tablet     No current facility-administered medications for this visit.       ALLERGIES  Allergies   Allergen Reactions     Seasonal Allergies Hives       OBSTETRIC/GYNECOLOGIC HISTORY  Currently sexually active with 1 partner. No concern for STIs.       OB History    Para Term  AB Living   0 0 0 0 0 0   SAB TAB Ectopic Multiple Live Births   0 0 0 0 0       PAST SURGICAL HISTORY   Past Surgical History:   Procedure Laterality Date     ENT SURGERY   "2010       SOCIAL HISTORY  Social History     Tobacco Use     Smoking status: Never Smoker     Smokeless tobacco: Never Used   Substance Use Topics     Alcohol use: Not on file     Drug use: Not on file     Social History     Social History Narrative     Not on file       FAMILY HISTORY  No family history on file.    REVIEW OF SYSTEMS  A 10 point review of systems including Constitutional, Eyes, Respiratory, Cardiovascular, Gastroenterology, Genitourinary, Integumentary, Musculoskeletal, and Psychiatric, were all negative, except for pertinent positives noted in the above HPI.    OBJECTIVE  /69 (BP Location: Right arm, Patient Position: Chair)   Pulse 73   Ht 1.702 m (5' 7\")   Wt 69.9 kg (154 lb 3.2 oz)   BMI 24.15 kg/m      General: Alert, without distress   Pelvic: normal external female genitalia; normal vagina without discharge; normal cervix without lesions/masses;  uterus small, mobile, nontender; adnexae nontender and without masses; normal anus/perineum   Extremities: normal    IUD Removal Procedure Note  Telma Freed and MARLEE discussed the risks and benefits removing the IUD, including infection, pain, bleeding, and incomplete/unsuccessful removal. She agreed to proceed.    A speculum was placed into the vagina. The strings of the Mirena IUD were grasped with a ring forcep and removed intact without difficulty. The cervix was hemostatic and the instruments removed from the vagina. She tolerated the procedure well.    Nexplanon Insertion Procedure Note    Telma Freed desired a Nexplanon  insertion. We discussed the risks/benefits of the procedure, including small scar on the arm, mild pain/bruising after the procedure, menstrual changes including irregular bleeding/spotting, and that the device is effective for 5 years and must be removed after that time.     The skin of the left upper arm 3 finger widths from the olecranon process was cleansed with iodine. 3 mL of 1% lidocaine was injected " into the same arm and then the nexplanon device was inserted subdermally according to the 's instructions. Both myself and the patient felt the elise after placement. The site was dressed with steristrips, a bandaid, and kerlix. Lot [id number], exp .     Telma Freed tolerated the procedure well.       ASSESSMENT  Telma Freed is a 21 year old, , with PMH of ITP who presents with breast pain and cramping in the setting of a Mirena IUD.    PLAN    Heavy menstrual bleeding secondary to ITP   Breast swelling & Cramping in setting of Mirena IUD   Patient's symptoms are correspond with ovulation and menstruation; discussed options to suppress cycle including oral contraceptives in addition to IUD to control bleeding vs a switch in hormonal contraceptive to a method that suppresses ovulation such as nexplanon or depo-provera. Patient expressed desire to not have to take daily medication in addition to TXA and opted for nexplanon insertion and IUD removal today.   -Nexplanon (etonogestrel) implant insertion performed today. May be removed at any time at [place]'s request but is due for removal by 2028.   -Mirena IUD removal performed today, uncomplicated. No back up method of contraception needed given method switch. Reviewed use of barrier methods needed for STD protection.     Age 19-39 Annual Preventive Exam  Up to date on pap smear, has received HPV, flu, and COVID vaccinations.   -declined STD screening today    [place] will followup in 1 year for an annual examination unless concerns arise.       MALISSA MENDEZ, MS3  [employer]     OBGYN Attending Addendum    I appreciate the note above by medical student, Malissa Mendez. I, Josseline Montoya, was present with the medical student, who participated in the service and in the documentation of the note. I have verified the history and personally performed the physical exam and medical decision making. I have  edited accordingly and agree with the assessment and plan of care as documented in the note.    Josseline Montoya MD, MSCI    [facility]  Date of Service: 9/8/21

## 2021-09-10 PROBLEM — Z30.09 GENERAL COUNSELING FOR PRESCRIPTION OF ORAL CONTRACEPTIVES: Status: ACTIVE | Noted: 2021-09-10

## 2021-09-10 PROBLEM — Z30.09 ENCOUNTER FOR COUNSELING REGARDING CONTRACEPTION: Status: ACTIVE | Noted: 2021-09-10

## 2021-09-16 DIAGNOSIS — E61.1 IRON DEFICIENCY: ICD-10-CM

## 2021-09-16 RX ORDER — TRANEXAMIC ACID 650 MG/1
1300 TABLET ORAL 2 TIMES DAILY
Qty: 120 TABLET | Refills: 1 | Status: SHIPPED | OUTPATIENT
Start: 2021-09-16 | End: 2022-01-03

## 2021-09-16 NOTE — TELEPHONE ENCOUNTER
Refill due next week for Telma for Tranexamic Acid 650mg   Last filled and mailed out 8/24/21  Last visit 5/5/21 with Dr. Chin    Thanks,  Estelita Barajas, PharmD  Pharmacist

## 2021-10-10 ENCOUNTER — HEALTH MAINTENANCE LETTER (OUTPATIENT)
Age: 21
End: 2021-10-10

## 2022-01-03 DIAGNOSIS — E61.1 IRON DEFICIENCY: ICD-10-CM

## 2022-01-03 RX ORDER — TRANEXAMIC ACID 650 MG/1
1300 TABLET ORAL 2 TIMES DAILY
Qty: 120 TABLET | Refills: 1 | Status: SHIPPED | OUTPATIENT
Start: 2022-01-03 | End: 2022-01-27

## 2022-01-24 ENCOUNTER — TELEPHONE (OUTPATIENT)
Dept: OBGYN | Facility: CLINIC | Age: 22
End: 2022-01-24
Payer: COMMERCIAL

## 2022-01-24 DIAGNOSIS — Z97.5 BREAKTHROUGH BLEEDING ON NEXPLANON: Primary | ICD-10-CM

## 2022-01-24 DIAGNOSIS — N92.1 BREAKTHROUGH BLEEDING ON NEXPLANON: Primary | ICD-10-CM

## 2022-01-24 NOTE — TELEPHONE ENCOUNTER
----- Message from Coni Archuleta RN sent at 1/24/2022 10:53 AM CST -----  Regarding: Questions prescription for oral contraceptive  Saw Dr. Montoya in September and had Nexplanon placed. Still having breakthrough bleeding. Requesting addition of OCP rx.

## 2022-01-24 NOTE — TELEPHONE ENCOUNTER
----- Message from Coni Archuleta RN sent at 1/24/2022  2:35 PM CST -----  Regarding: Returning call to nurse

## 2022-01-24 NOTE — TELEPHONE ENCOUNTER
Telma had nexplanon placed in September.  Had no bleeding for 3 months, then had spotting for a few days in December.  Now has had bleeding daily for a month.  She states that initially this was 2 tampons per day and one overnight, but for the past week she has been changing tampons every 3-4 hours.      She has a bleeding disorder that she states contributes, and she is using tranexamic acid 1200 mg TID to try to reduce bleeding.      She is asking if adding an estrogen OCP would be helpful.    Routed to Dr Lopez to advise.

## 2022-01-25 RX ORDER — NORGESTIMATE AND ETHINYL ESTRADIOL 0.25-0.035
1 KIT ORAL DAILY
Qty: 84 TABLET | Refills: 3 | Status: SHIPPED | OUTPATIENT
Start: 2022-01-25 | End: 2022-06-15

## 2022-01-25 NOTE — TELEPHONE ENCOUNTER
Rx for CLARISSA sent, this may not help as breakthrough bleeding is a normal side effect of Nexplanon.  She should follow up with Dr. Montoya in person or by phone if not improving.

## 2022-01-26 NOTE — TELEPHONE ENCOUNTER
Tried to reach Telma,  but received voicemail.  Left message with Dr Lopez's response.  TCB if additional questions.

## 2022-01-27 DIAGNOSIS — D69.3 IDIOPATHIC THROMBOCYTOPENIA (H): ICD-10-CM

## 2022-01-27 DIAGNOSIS — E61.1 IRON DEFICIENCY: ICD-10-CM

## 2022-01-27 DIAGNOSIS — D69.9 BLEEDING DIATHESIS (H): ICD-10-CM

## 2022-01-27 DIAGNOSIS — R23.3 EASY BRUISING: Primary | ICD-10-CM

## 2022-01-27 RX ORDER — AMINOCAPROIC ACID 1000 MG/1
TABLET ORAL
Qty: 90 TABLET | Refills: 2 | Status: SHIPPED | OUTPATIENT
Start: 2022-01-27 | End: 2022-06-15

## 2022-03-09 ENCOUNTER — OFFICE VISIT (OUTPATIENT)
Dept: HEMATOLOGY | Facility: CLINIC | Age: 22
End: 2022-03-09
Attending: INTERNAL MEDICINE
Payer: COMMERCIAL

## 2022-03-09 VITALS
WEIGHT: 154.8 LBS | SYSTOLIC BLOOD PRESSURE: 120 MMHG | RESPIRATION RATE: 16 BRPM | HEART RATE: 70 BPM | BODY MASS INDEX: 24.3 KG/M2 | OXYGEN SATURATION: 99 % | TEMPERATURE: 98 F | DIASTOLIC BLOOD PRESSURE: 78 MMHG | HEIGHT: 67 IN

## 2022-03-09 DIAGNOSIS — E61.1 IRON DEFICIENCY: ICD-10-CM

## 2022-03-09 DIAGNOSIS — D69.3 IDIOPATHIC THROMBOCYTOPENIC PURPURA (H): ICD-10-CM

## 2022-03-09 DIAGNOSIS — D69.3 IDIOPATHIC THROMBOCYTOPENIA (H): ICD-10-CM

## 2022-03-09 DIAGNOSIS — D69.9 BLEEDING DISORDER (H): Primary | ICD-10-CM

## 2022-03-09 LAB
BASOPHILS # BLD AUTO: 0 10E3/UL (ref 0–0.2)
BASOPHILS NFR BLD AUTO: 1 %
EOSINOPHIL # BLD AUTO: 0 10E3/UL (ref 0–0.7)
EOSINOPHIL NFR BLD AUTO: 1 %
ERYTHROCYTE [DISTWIDTH] IN BLOOD BY AUTOMATED COUNT: 11.9 % (ref 10–15)
FERRITIN SERPL-MCNC: 28 NG/ML (ref 12–150)
HCT VFR BLD AUTO: 42 % (ref 35–47)
HGB BLD-MCNC: 14 G/DL (ref 11.7–15.7)
IMM GRANULOCYTES # BLD: 0 10E3/UL
IMM GRANULOCYTES NFR BLD: 0 %
IRON SATN MFR SERPL: 31 % (ref 15–46)
IRON SERPL-MCNC: 122 UG/DL (ref 35–180)
LYMPHOCYTES # BLD AUTO: 1.6 10E3/UL (ref 0.8–5.3)
LYMPHOCYTES NFR BLD AUTO: 34 %
MCH RBC QN AUTO: 30.4 PG (ref 26.5–33)
MCHC RBC AUTO-ENTMCNC: 33.3 G/DL (ref 31.5–36.5)
MCV RBC AUTO: 91 FL (ref 78–100)
MONOCYTES # BLD AUTO: 0.4 10E3/UL (ref 0–1.3)
MONOCYTES NFR BLD AUTO: 8 %
NEUTROPHILS # BLD AUTO: 2.7 10E3/UL (ref 1.6–8.3)
NEUTROPHILS NFR BLD AUTO: 56 %
NRBC # BLD AUTO: 0 10E3/UL
NRBC BLD AUTO-RTO: 0 /100
PLATELET # BLD AUTO: 122 10E3/UL (ref 150–450)
RBC # BLD AUTO: 4.61 10E6/UL (ref 3.8–5.2)
RETICS # AUTO: 0.07 10E6/UL (ref 0.03–0.1)
RETICS/RBC NFR AUTO: 1.5 % (ref 0.5–2)
TIBC SERPL-MCNC: 392 UG/DL (ref 240–430)
WBC # BLD AUTO: 4.8 10E3/UL (ref 4–11)

## 2022-03-09 PROCEDURE — 82728 ASSAY OF FERRITIN: CPT | Performed by: INTERNAL MEDICINE

## 2022-03-09 PROCEDURE — 36415 COLL VENOUS BLD VENIPUNCTURE: CPT | Performed by: INTERNAL MEDICINE

## 2022-03-09 PROCEDURE — 99215 OFFICE O/P EST HI 40 MIN: CPT | Performed by: INTERNAL MEDICINE

## 2022-03-09 PROCEDURE — 83550 IRON BINDING TEST: CPT | Performed by: INTERNAL MEDICINE

## 2022-03-09 PROCEDURE — 85045 AUTOMATED RETICULOCYTE COUNT: CPT | Performed by: INTERNAL MEDICINE

## 2022-03-09 PROCEDURE — 85290 CLOT FACTOR XIII FIBRIN STAB: CPT | Performed by: INTERNAL MEDICINE

## 2022-03-09 PROCEDURE — 85025 COMPLETE CBC W/AUTO DIFF WBC: CPT | Performed by: INTERNAL MEDICINE

## 2022-03-09 PROCEDURE — G0463 HOSPITAL OUTPT CLINIC VISIT: HCPCS

## 2022-03-09 RX ORDER — TRANEXAMIC ACID 650 MG/1
1300 TABLET ORAL 3 TIMES DAILY
Qty: 180 TABLET | Refills: 3 | Status: SHIPPED | OUTPATIENT
Start: 2022-03-09 | End: 2022-09-06

## 2022-03-09 NOTE — PROGRESS NOTES
Reason for Consultation:  Telma Freed is a referred by Franny Beaver MD for evaluation and treatment of thrombocytopenia.     Assessment:  In summary, Telma Freed is a 21 year old woman with no significant past medical history who presented in 2019 after she was diagnosed with mono and developed thrombocytopenia to platelet count of <40K. She was noted to have thrombocytopenia since 2014 and personal history of easy bruising and heavy menstrual bleeding.      1). Chronic thrombocytopenia, likely ITP  2). Easy bruising with normal platelet aggregation and VWF evaluation  3). Low serum ferritin  4). Heavy menstrual bleeding     During her initial evaluation in May 2019 she had a  personal history of easy bruising and had an ISTH bleeding assessment score of 5 (Clara BUSTOS et al. 2014 J), in female patients a score of <6 adult female is considered normal. The biggest caveat is that this score is weighed toward VWD not platelet disorders AND patient has not gone through extreme stressors (childbirth, major surgery).     Her mother also has history of easy bruising and heavy menstrual bleeding- raising concern for an inheirted disorder. Her evaluation, which included PFA, VWF screening and platelet aggregometry was normal. She continued to have platelet count in 120 range. Her peripheral blood has normal platelet morphology, making macrothrombocytopenia less likely. Repeat VWF testing including collagen binding, propeptide and 2N testing remained normal. Platelet EM with potential macrothrombocytopenia and decreased alpha granules. Will send of glyprotein flow with [place]- but at presents she has bleeding disorder NOS--- with pattent consistent with qualititative and quantitative platelet disorder. It is possible that Telma may have either a collagen defect (William Danlos) or fibrinolytic disorder. Given her symptoms- will do a Stimate stimulation trial with labs to see if she response and  tolerates the medication. Discussed the risks and benefits of DDVAP, including flushing, hyponatremia and tacyphlaxis. This will need to be done at [facility] due to her challenging lab access.     Regarding her low ferritin- remains low with iron sat of 31. Currently does NOT have anemia- however if her heavy menstrual bleedign continues will likely start to develop anemia. Plan to give 1 dose of IV iron (Injectafer 750 mg) to prevent anemia.     improved with oral supplementation from 10 to 27. Serum iron elevated- may reflect supplement taken prior to draw. Will repeat at next visit with instructions to not take oral iron prior to draw.     Telma is instructed to contact clinic if bruising or new symptoms worsen.       Plan:  1. Majority of today's visit was spent counseling the patient regarding thrombocytopenia.  2.   Orders Placed This Encounter   Procedures     Ferritin     Iron and iron binding capacity     Factor 13 Antigen Subunit A     Reticulocyte count     CBC with platelets and differential     Asymptomatic COVID-19 Virus (Coronavirus) by PCR     [employer]; PLAFL (Laboratory Miscellaneous Order)     CBC with platelets differential   3. Continue prenatal vitamin (for iron) or iron supplement  -- IV  - Injectafer 750 mg IV  4. If insurance coverage/co-pays permit- Change to Tranexamic acid 1300 mg BID  5. Contact clinic if symptoms worsen    The patient is given our center's contact information and is instructed to call if she should have any further questions or concerns.  Otherwise, we will plan on seeing her back Follow up with Provider - 3 months or sooner if needed      Yin Chin MD/PhD   of Medicine  [facility]     ----------------------------------------------------------------------------------------------------------------------     Interval History:  Telma Freed is a 21 year old woman who first presented in  "May 2019 in consultation due to chronic thrombocytopenia (platelets ~120) with recent worsening due to mononucleosis and lifelong history of easy bruising. Please refer to my initial consult note for further details.     Telma reports that she had IUD removed and Nexplanon placed. This went well for bleeding until January, but then she started to have severe bleeding. Bleeding required her to change 1 super tampon every 3 h. Around the same time her insurance changed and had to change to amicar- she finds that this is not as effective as Tranexamic acid. She still had heavy bleeding in Feb- so she contacted OB and was started on OCPs as well. Right now she is noting some bleeding during the time right before the placebo pills- which she is skipping. She endorse fatigue. She also has some areas of petchaie on her legs.       Past Medical History:  EBV Mono     Past Surgical History:  Adenoids     Medications:  Current Outpatient Medications   Medication     Aminocaproic Acid (AMICAR) 1000 MG TABS     levocetirizine (XYZAL) 5 MG tablet     norgestimate-ethinyl estradiol (ORTHO-CYCLEN) 0.25-35 MG-MCG tablet     No current facility-administered medications for this visit.        Allergies:       Allergies   Allergen Reactions     Seasonal Allergies Hives        ROS:  A 7 point ROS is negative except as stated in the HPI     Social History:  Denies any tobacco use. Drinks alcohol on weekends. Abstained during her acute illness with mono. Denies any illicit drug use. Will be graduating from [employer] in May- has accepted a position at an [employer] in [place], [place]     Family History:  Mother with easy bruising, heavy bleeding   Younger brother with no bleeding history per patient  Maternal grandmother  Maternal grandfather  Maternal aunt     Patient is not aware of her father's family history    Objective:  /78   Pulse 70   Temp 98  F (36.7  C) (Oral)   Resp 16   Ht 1.702 m (5' 7\")   Wt 70.2 " kg (154 lb 12.8 oz)   SpO2 99%   BMI 24.25 kg/m      Exam:   Constitutional: Appears well, no distress  HEENT: Pupils equal and reactive to light. No scleral icterus or hemorrhage. Nares without evidence of telangiectasia. Mask in place due to COVID restrictions. No lymphadenopathy, no thyromeagaly  CV: regular rate and rhythm, no murmurs  Respiratory: clear, no accessory muscle use  GI: abdomen soft, nontender, without guarding or rebound. No hepatomeagaly. No splenomegaly.   Mus/Skele: no edema  Skin: no petechiae, no ecchymosis.  Neuro: CN II-XII intact. Normal gait. AOx3  Heme/Lymph: no supraclavicular, axillary or umbilical adenopathy.       Labs: personally reviewed with relevant trends annotated below    Bleeding evaluation  The von Willebrand factor antigen (VWF:Ag) was 124% (range %), von Willebrand factor activity (VWF:ACT) was 122% (range %), and Factor 8 levels are within normal limits (130%, range %). Ristocetin Cofactor Activity (VWF:RCo) is normal at 79% (range %). The Factor 8  to VWF:Ag ratio and the VWF:ACT to VWF:Ag ratio are within normal limits.     The  VWF:RCo to VWF:Ag ratio is decreased/borderline low normal.       The distribution of plasma von Willebrand factor multimers is normal (see report from [facility]).     In light of the presence of a  decreased/borderline low normal VWF:RCo  to VWF:Ag ratio, presence of  a Type 2 variant (e.g. Type 2M) and some allele variants not associated with bleeding (Flood et al. Blood 2010;116:280-6 and Flood et al. Blood 2013;121:3742-4) can not be excluded.     VWF propeptide- normal  Collagen binding- normal  Platelet antibodies- negative platelet associated IgG antibodies        Platelet aggregometry  Arachadonic Acid  See table below     Comment: (Note)   COMMENTS:                       Normal platelet aggregation study.  The patient is noted to have mild   thrombocytopenia and the platelet count of the platelet rich  plasma   (PRP) used for testing is mildly decreased. Platelet aggregation   reference ranges have been established for a platelet count of   250x10^9/L in the PRP.  Due to thrombocytopenia in the PRP,   aggregation tracings are interpreted based on ranges reported in   literature (Khai et al. Thromb Haemost 2008;100:134-145). Maximal   platelet aggregation is within normal limits with ADP, Epinephrine,   Collagen, Arachidonic Acid, and low and high doses of Ristocetin.   ATP release is within normal limits with ADP and Thrombin. These   findings are non-diagnostic for a platelet function disorder. If a   platelet function disorder is suspected, consider repeat testing and   electron microscopy.                                       Brandee Medeiros M.D. [phone]                       6/26/2019                                   ATP RELEASE:             ATP Release with ADP:          Normal, 0.87 nm       ATP Release with Thrombin:     Normal, 1.32 nm       AGGREGATION:   Reagent  Concentration    Primary Agg         Maximal Agg   ADP      5 micromolar     Single large wave   Normal, 77%   EPI      10 micromolar    Present             Normal, 72%   COL      2.0 mcg/mL       -------             Normal, 90%   Ar. A.   0.50 mg/mL       -------             Normal, 71%   REFERENCE RANGES:   ATP release with TBN  Greater than or equal to 0.50 nm   ATP release with ADP  Greater than or equal to 0.40 nm   ADP % Agg             Greater than or equal to 64%   EPI % Agg             Greater than or equal to 63%   COL % Agg             Greater than or equal to 66%   AA  % Agg             Greater than or equal to 63%    Ristocetin-4 Conc  (Note)     Comment: COMMENTS:                       See full platelet aggregation comments.                       Brandee Medeiros M.D. [phone]                       6/26/2019                         RISTOCETIN:   Concentration    Primary Agg        Maximal Agg   0.50 mg/mL       Absent              Normal, 4%     1.00 mg/mL       Present            Normal, 83%   1.25 mg/mL       Single large wave  Normal, 91%   REFERENCE RANGES:   Ristocetin Conc.  % Aggregation   Risto 0.5 mg/mL   Less than or equal to 6%   Risto 1.0 mg/mL   Greater than or equal to 11%   Risto 1.25 mg/mL  Greater than or equal to 76%      Hepatitis B surface and core antibodies were nonreactive  CMV IgG negative  HIV and Hepatitis C negative    EBV DNA was positive with 19,473 DNA copies/mL-- consistent with history of mono  Pargovirus B19 IgG positive, IgM negative    Platelet EM   ELECTRON MICROSCOPIC DESCRIPTION:      PTEM studies are performed. The quality of the EM studies      is adequate.            Whole mount PTEM study demonstrates essentially normal mean      dense body count, 4.5 dense granules per platelet (100      platelets are counted; normal adult range is greater than      or equal to 1.2 dense granules per platelet).  There are no      abnormal dense granules identified. Note: Dense body count      could be falsely low due to inadequate sample      transportation or storage.            Thin section PTEM study demonstrates a subset of apparently      large and round platelets, some showing decreased alpha      granules and increased vacuoles and canalicular networks.      There are no abnormal membranous inclusions in platelets.            Buffy coat EM study shows no inclusions in white blood      cells.     Lab Results   Component Value Date    WBC 4.8 03/09/2022    WBC 4.8 05/05/2021     Lab Results   Component Value Date    RBC 4.61 03/09/2022    RBC 4.60 05/05/2021     Lab Results   Component Value Date    HGB 14.0 03/09/2022    HGB 14.4 05/05/2021     Lab Results   Component Value Date    HCT 42.0 03/09/2022    HCT 43.1 05/05/2021     Lab Results   Component Value Date    MCV 91 03/09/2022    MCV 94 05/05/2021     Lab Results   Component Value Date    MCH 30.4 03/09/2022    MCH 31.3 05/05/2021     Lab  Results   Component Value Date    MCHC 33.3 03/09/2022    MCHC 33.4 05/05/2021     Lab Results   Component Value Date    RDW 11.9 03/09/2022    RDW 12.1 05/05/2021     Lab Results   Component Value Date     03/09/2022     05/05/2021         Ferritin   Date Value Ref Range Status   03/09/2022 28 12 - 150 ng/mL Final   05/05/2021 27 12 - 150 ng/mL Final     Iron   Date Value Ref Range Status   03/09/2022 122 35 - 180 ug/dL Final   05/05/2021 219 (H) 35 - 180 ug/dL Final     Iron Binding Cap   Date Value Ref Range Status   05/05/2021 339 240 - 430 ug/dL Final     Iron Binding Capacity   Date Value Ref Range Status   03/09/2022 392 240 - 430 ug/dL Final

## 2022-03-09 NOTE — NURSING NOTE
Telma Freed is here for a follow up visit and is  being seen for chronic thrombocytopenia.      Vital signs were taken and assessed.  Allergies and medications were reviewed and updated by [facility] staff.    I introduced myself and my role and provided them with a contact card containing our information.    The follow up plan was developed and we reviewed this plan point by point and all questions answered.  The patient verbalized understanding of  and agreement with plan.  The AVS instructions were then printed and given to the patient.     Escorted patient to the lab and she will call in to make follow up visit.    I  thanked them for coming and encouraged them to call with any concerns or questions.    Rima Edwards, RN - Nurse Clinician - [facility] - [phone]

## 2022-03-09 NOTE — PATIENT INSTRUCTIONS
[facility]  [facility]  [facility], [place] [id number]  Main: [phone], Fax: [phone]    It was a pleasure seeing you today.  Thank you for allowing us to be involved in your care.  Please let us know if there is anything else we can do for you, so that we can be sure you are leaving completely satisfied with your care experience.    Labs today.  Our lab is down the hallway in our clinic space.  We will communicate these to you by [employer]. With your permission we may leave a detailed voicemail, please see below for our contact information should you have any questions about your results. Also, please note that some lab results may take a week or so to get back. Feel free to call or message if you have not heard back from us within 7-10 days.    Patient Education & Resources:  For additional information, please see the following web links:  www.stoptheclot.org, www.clotconnect.org.    Call the [facility]  at [phone].     -If surgeries or procedures are planned (for holding instructions).     -If off anticoagulation, please call during high risk times (long-distance travel, broken bones or trauma, immobilization, surgery, pregnancy, or taking estrogen).     -Any new symptoms of DVT (deep vein thrombosis) or PE (pulmonary embolism)    -pain     -swelling     -redness    -warmth    -shortness of breath    -chest pain    -coughing up blood    We would like a provider on our team to see you at least annually for optimal care and to allow us to continue to prescribe for you.  Bryant Garcia PA-C is our physician assistants that are specialized in bleeding and clotting disorders that you may be able to see more readily.    Return to clinic in  3 months.  Please schedule return appointment with Dr. hCin.    Your nurse clinician is Rima William, [phone].   If they are unavailable and you have  immediate concerns, please call [phone] and ask for a nurse.

## 2022-03-09 NOTE — Clinical Note
Sorry for the delay- was tied up with service and then Ortiz's accident.   I have placed Injectafer x1 and COVID test for Telma. I have also placed order for Stimate (DDAVP) trial in infusion center along with COVID orders. I ordered the DDAVP from [facility] pharmacy.

## 2022-03-10 LAB — FACT XIII AG ACT/NOR PPP IA: 94 % (ref 75–155)

## 2022-03-21 RX ORDER — ALBUTEROL SULFATE 90 UG/1
1-2 AEROSOL, METERED RESPIRATORY (INHALATION)
Status: CANCELLED
Start: 2022-04-04

## 2022-03-21 RX ORDER — DIPHENHYDRAMINE HYDROCHLORIDE 50 MG/ML
50 INJECTION INTRAMUSCULAR; INTRAVENOUS
Status: CANCELLED
Start: 2022-04-04

## 2022-03-21 RX ORDER — DESMOPRESSIN ACETATE 150/SPRAY
1 AEROSOL, SPRAY WITH PUMP (EA) NASAL ONCE
Status: CANCELLED
Start: 2022-04-04 | End: 2022-04-04

## 2022-03-21 RX ORDER — DESMOPRESSIN ACETATE 150/SPRAY
1 AEROSOL, SPRAY WITH PUMP (EA) NASAL DAILY
Qty: 0.3 ML | Refills: 0 | Status: SHIPPED | OUTPATIENT
Start: 2022-03-21 | End: 2022-06-15

## 2022-03-21 RX ORDER — MEPERIDINE HYDROCHLORIDE 25 MG/ML
25 INJECTION INTRAMUSCULAR; INTRAVENOUS; SUBCUTANEOUS EVERY 30 MIN PRN
Status: CANCELLED | OUTPATIENT
Start: 2022-04-04

## 2022-03-21 RX ORDER — EPINEPHRINE 1 MG/ML
0.3 INJECTION, SOLUTION, CONCENTRATE INTRAVENOUS EVERY 5 MIN PRN
Status: CANCELLED | OUTPATIENT
Start: 2022-04-04

## 2022-03-21 RX ORDER — NALOXONE HYDROCHLORIDE 0.4 MG/ML
0.2 INJECTION, SOLUTION INTRAMUSCULAR; INTRAVENOUS; SUBCUTANEOUS
Status: CANCELLED | OUTPATIENT
Start: 2022-04-04

## 2022-03-21 RX ORDER — ALBUTEROL SULFATE 0.83 MG/ML
2.5 SOLUTION RESPIRATORY (INHALATION)
Status: CANCELLED | OUTPATIENT
Start: 2022-04-04

## 2022-03-22 ENCOUNTER — TELEPHONE (OUTPATIENT)
Dept: HEMATOLOGY | Facility: CLINIC | Age: 22
End: 2022-03-22
Payer: COMMERCIAL

## 2022-03-22 NOTE — TELEPHONE ENCOUNTER
Telma Freed  [id number]  2000    Received message from Infusion center that Telma's iron was covered by insurance. She does not want to have to pay out of pocket for this infusion. She is uncertain about the DDAVP infusion, as was hoping to hear from the doctor about results. She said she would have no time to schedule this prior to summer as is busy with classes. She said she is taking TXA on days of her period and this is working well.  Steph Plasencia, MSN, RN, PHN -Nurse Clinician, [facility] [phone]     Steph Plasencia MSN, RN, PHN -Nurse Clinician, [facility] [phone]

## 2022-05-11 ENCOUNTER — TELEPHONE (OUTPATIENT)
Dept: HEMATOLOGY | Facility: CLINIC | Age: 22
End: 2022-05-11
Payer: COMMERCIAL

## 2022-05-11 DIAGNOSIS — D69.3 IDIOPATHIC THROMBOCYTOPENIA (H): Primary | ICD-10-CM

## 2022-05-11 NOTE — TELEPHONE ENCOUNTER
Telma Freed was seen on 3.9.2022 by Dr. Yin Chin at the [facility] at [facility] for:   1). Chronic thrombocytopenia, likely ITP  2). Easy bruising with normal platelet aggregation and VWF evaluation  3). Low serum ferritin  4). Heavy menstrual bleeding    Telma is calling today with concerns about ongoing heavy periods.      She reports the following history:    Seen by OB in August/September on 2021 for IUD removal (she was having SE) and placement of Implanon    She had no periods through the rest of 2021    Heavy menstrual bleeding started in early 2022 and continued for 2 months.    OB added oral birth control which per Telma, only made bleeding worse.  She stopped the oral formulation in March. Implanon still in place.    Seen by Dr. Chin in March 2022, TXA added.    Her recent cycle started 3.5 weeks ago and has been continuous.  She has been on TXA without reduction in bleeding. She also endorses fatigue and is weary of her bleeding.    Telma is wondering about next steps.  She has not been back to OB and has not had labs drawn since March.    Told Telma I would review with Dr. Chin and get back to her.    Rima PEREZN, RN   Nurse Clinician  [facility]  [facility]  Office: [phone]  Main Office: [phone] (ask to speak with a nurse)  Fax: [phone]

## 2022-05-17 ENCOUNTER — ALLIED HEALTH/NURSE VISIT (OUTPATIENT)
Dept: HEMATOLOGY | Facility: CLINIC | Age: 22
End: 2022-05-17
Payer: COMMERCIAL

## 2022-05-17 DIAGNOSIS — D69.3 IDIOPATHIC THROMBOCYTOPENIA (H): ICD-10-CM

## 2022-05-17 DIAGNOSIS — D69.9 BLEEDING DISORDER (H): ICD-10-CM

## 2022-05-17 LAB
ALBUMIN SERPL-MCNC: 4.2 G/DL (ref 3.4–5)
ALP SERPL-CCNC: 60 U/L (ref 40–150)
ALT SERPL W P-5'-P-CCNC: 96 U/L (ref 0–50)
ANION GAP SERPL CALCULATED.3IONS-SCNC: 4 MMOL/L (ref 3–14)
AST SERPL W P-5'-P-CCNC: 69 U/L (ref 0–45)
BASOPHILS # BLD AUTO: 0.1 10E3/UL (ref 0–0.2)
BASOPHILS NFR BLD AUTO: 1 %
BILIRUB SERPL-MCNC: 1.5 MG/DL (ref 0.2–1.3)
BUN SERPL-MCNC: 9 MG/DL (ref 7–30)
CALCIUM SERPL-MCNC: 9.1 MG/DL (ref 8.5–10.1)
CF REDUC 30M P MA P HEP LENFR BLD TEG: 1.6 % (ref 0–8)
CF REDUC 60M P MA P HEPASE LENFR BLD TEG: 5.7 % (ref 0–15)
CFT P HPASE BLD TEG: 1.9 MINUTE (ref 1–3)
CHLORIDE BLD-SCNC: 108 MMOL/L (ref 94–109)
CI (COAGULATION INDEX)(Z): -0.4 (ref -3–3)
CLOT ANGLE P HPASE BLD TEG: 63.3 DEGREES (ref 53–72)
CLOT INIT P HPASE BLD TEG: 5.8 MINUTE (ref 5–10)
CLOT STRENGTH P HPASE BLD TEG: 7 KD/SC (ref 4.5–11)
CO2 SERPL-SCNC: 27 MMOL/L (ref 20–32)
CREAT SERPL-MCNC: 0.58 MG/DL (ref 0.52–1.04)
EOSINOPHIL # BLD AUTO: 0.2 10E3/UL (ref 0–0.7)
EOSINOPHIL NFR BLD AUTO: 3 %
ERYTHROCYTE [DISTWIDTH] IN BLOOD BY AUTOMATED COUNT: 12 % (ref 10–15)
GFR SERPL CREATININE-BSD FRML MDRD: >90 ML/MIN/1.73M2
GLUCOSE BLD-MCNC: 90 MG/DL (ref 70–99)
HCT VFR BLD AUTO: 41.3 % (ref 35–47)
HGB BLD-MCNC: 13.8 G/DL (ref 11.7–15.7)
IMM GRANULOCYTES # BLD: 0 10E3/UL
IMM GRANULOCYTES NFR BLD: 0 %
IRON SATN MFR SERPL: 38 % (ref 15–46)
IRON SERPL-MCNC: 142 UG/DL (ref 35–180)
LDH SERPL L TO P-CCNC: 225 U/L (ref 81–234)
LYMPHOCYTES # BLD AUTO: 2.5 10E3/UL (ref 0.8–5.3)
LYMPHOCYTES NFR BLD AUTO: 36 %
Lab: NORMAL
Lab: NORMAL
MCF P HPASE BLD TEG: 58.2 MM (ref 50–70)
MCH RBC QN AUTO: 30.3 PG (ref 26.5–33)
MCHC RBC AUTO-ENTMCNC: 33.4 G/DL (ref 31.5–36.5)
MCV RBC AUTO: 91 FL (ref 78–100)
MONOCYTES # BLD AUTO: 0.6 10E3/UL (ref 0–1.3)
MONOCYTES NFR BLD AUTO: 9 %
NEUTROPHILS # BLD AUTO: 3.5 10E3/UL (ref 1.6–8.3)
NEUTROPHILS NFR BLD AUTO: 51 %
NRBC # BLD AUTO: 0 10E3/UL
NRBC BLD AUTO-RTO: 0 /100
PERFORMING LABORATORY: NORMAL
PERFORMING LABORATORY: NORMAL
PLATELET # BLD AUTO: 130 10E3/UL (ref 150–450)
POTASSIUM BLD-SCNC: 3.6 MMOL/L (ref 3.4–5.3)
PROT SERPL-MCNC: 7.6 G/DL (ref 6.8–8.8)
RBC # BLD AUTO: 4.56 10E6/UL (ref 3.8–5.2)
RETICS # AUTO: 0.07 10E6/UL (ref 0.03–0.1)
RETICS/RBC NFR AUTO: 1.5 % (ref 0.5–2)
SODIUM SERPL-SCNC: 139 MMOL/L (ref 133–144)
SPECIMEN STATUS: NORMAL
SPECIMEN STATUS: NORMAL
TEST NAME: NORMAL
TEST NAME: NORMAL
TIBC SERPL-MCNC: 373 UG/DL (ref 240–430)
WBC # BLD AUTO: 6.8 10E3/UL (ref 4–11)

## 2022-05-17 PROCEDURE — 36415 COLL VENOUS BLD VENIPUNCTURE: CPT

## 2022-05-17 PROCEDURE — 84999 UNLISTED CHEMISTRY PROCEDURE: CPT

## 2022-05-17 PROCEDURE — 88184 FLOWCYTOMETRY/ TC 1 MARKER: CPT

## 2022-05-17 PROCEDURE — 85245 CLOT FACTOR VIII VW RISTOCTN: CPT

## 2022-05-17 PROCEDURE — 83615 LACTATE (LD) (LDH) ENZYME: CPT

## 2022-05-17 PROCEDURE — 80053 COMPREHEN METABOLIC PANEL: CPT

## 2022-05-17 PROCEDURE — 85246 CLOT FACTOR VIII VW ANTIGEN: CPT

## 2022-05-17 PROCEDURE — 83550 IRON BINDING TEST: CPT

## 2022-05-17 PROCEDURE — 88185 FLOWCYTOMETRY/TC ADD-ON: CPT

## 2022-05-17 PROCEDURE — 83520 IMMUNOASSAY QUANT NOS NONAB: CPT

## 2022-05-17 PROCEDURE — 999N000103 HC STATISTIC NO CHARGE FACILITY FEE

## 2022-05-17 PROCEDURE — 85290 CLOT FACTOR XIII FIBRIN STAB: CPT

## 2022-05-17 PROCEDURE — 85390 FIBRINOLYSINS SCREEN I&R: CPT

## 2022-05-17 PROCEDURE — 85240 CLOT FACTOR VIII AHG 1 STAGE: CPT

## 2022-05-17 PROCEDURE — 88187 FLOWCYTOMETRY/READ 2-8: CPT

## 2022-05-17 PROCEDURE — 88348 ELECTRON MICROSCOPY DX: CPT

## 2022-05-17 PROCEDURE — 85390 FIBRINOLYSINS SCREEN I&R: CPT | Mod: 26 | Performed by: PATHOLOGY

## 2022-05-17 PROCEDURE — 85045 AUTOMATED RETICULOCYTE COUNT: CPT

## 2022-05-17 PROCEDURE — 85396 CLOTTING ASSAY WHOLE BLOOD: CPT

## 2022-05-17 PROCEDURE — 85025 COMPLETE CBC W/AUTO DIFF WBC: CPT

## 2022-05-18 LAB — FACT XIII AG ACT/NOR PPP IA: 101 % (ref 75–155)

## 2022-05-19 LAB
FACT VIII ACT/NOR PPP: 114 % (ref 55–200)
VWF AG ACT/NOR PPP IA: 137 % (ref 50–200)
VWF:AC ACT/NOR PPP IA: 116 % (ref 50–180)
VWF:RCO ACT/NOR PPP PL AGG: NORMAL %

## 2022-05-20 LAB — MAYO MISC RESULT: NORMAL

## 2022-05-21 ENCOUNTER — HEALTH MAINTENANCE LETTER (OUTPATIENT)
Age: 22
End: 2022-05-21

## 2022-05-22 LAB — VWF:RCO ACT/NOR PPP PL AGG: 133 %

## 2022-05-23 LAB — MAYO MISC RESULT: NORMAL

## 2022-05-26 ENCOUNTER — TELEPHONE (OUTPATIENT)
Dept: HEMATOLOGY | Facility: CLINIC | Age: 22
End: 2022-05-26
Payer: COMMERCIAL

## 2022-05-26 NOTE — TELEPHONE ENCOUNTER
I spoke with Telma about scheduling a genetic counseling appointment to discuss genetic testing recommended by Dr. Chin. I explained that knowing the genetic basis of her symptoms could help inform her treatment plan moving forward. I also told her that the genetic counseling appointment would be free of charge and that we would do a genetic testing prior authorization for her and let her know the out-of-pocket cost before proceeding with testing.     She was not interested in scheduling anything today. She mentioned that she was recently in clinic for an appointment and that she had another visit scheduled in June. She said that she wanted to wait until she had a chance to discuss the test results from her most recent appointment with Dr. Chin and ask how genetic testing might shape her specific care strategy in the future before scheduling an additional appointment. She also mentioned that she plans to call the clinic for an update in the next few days.

## 2022-05-27 ENCOUNTER — TELEPHONE (OUTPATIENT)
Dept: OBGYN | Facility: CLINIC | Age: 22
End: 2022-05-27
Payer: COMMERCIAL

## 2022-05-27 NOTE — PROGRESS NOTES
"Discussed lab values with Telma      In summary Telma continues to have prolonged, regular and at times heavy menstrual bleeding. Last \"cycle\" was 4+weeks with some heaviness noted after activities. This was despite having Nexplanon and taking regular tranexamic acid medication and avoiding NSAIDs.     Thus far her only consistent lab abnormality is low platelets-- consistent with diagnosis of ITP--- however her bleeding phenotype (heavy menstrual bleeding leading to anemia, hematomas, easy bruising) is does not correlate well to her counts.     She has had macrothrombocytopenia in one platelet EM--- however platelet aggregation studies have been normal.       VWF testing with boarderline low VWF RCo:VWFAg ratios, but normal multimer.     Repeated some testing during her last prolonged bleeding. Notable that VWF activity is \"normal\" at 114%--- but this may be inappropriately so.       Discussed rationale for performing DDAVP trial. This would be to evaluate if her platelet count falls lower with DDAVP (consistent with type 2B) and to also see if she clears VWF propeptide faster (Type 1C).     Furthermore- have discussed with Dr. Leticia Madrid, who is on ISTH/WHF/ÓSCAR guidelines committee- and we may consider stending VWF mutant KY1kokun and VWF recominand GP1 alpha testing along with genetics.     However- cost for these tests is a concern for patient. Has recently graduated [employer] and does not want large medical bills before she moves.     Will discussed with [facility] team(s) to see how to best move forward in cost-efficient manner.     Last- discussed that once patient moves to [place] we can help facilitate referral to Darell Hernandez MD at the   [facility]  [phone] [address], [address]  [place], [place], [address]     Yin Chin MD/PhD   of Medicine  Division of Hematology, Oncology and " [facility]    [facility]  [address] [place] [address], [place] [address]  Main: [phone], Fax: [phone]

## 2022-05-27 NOTE — TELEPHONE ENCOUNTER
Health Call Center    Phone Message    May a detailed message be left on voicemail: yes     Reason for Call: Other: Pt called to schedule appt with Dr Montoya for bleeding disorders, struggles with nexplanon and possible US for polyps in uterus.  First available writer scheduled is 9/14/22.  Pt stated she will be moving out of state in August and is hoping for an appt before August.  Pt is on waitlist.  Please call if sooner appt is available.      Action Taken: Message routed to:  [facility] ([facility]): rufina    Travel Screening: Not Applicable

## 2022-05-31 LAB — SCANNED LAB RESULT: NORMAL

## 2022-06-01 LAB — VON WILLEBRAND EVAL PPP-IMP: NORMAL

## 2022-06-15 ENCOUNTER — OFFICE VISIT (OUTPATIENT)
Dept: HEMATOLOGY | Facility: CLINIC | Age: 22
End: 2022-06-15
Attending: OBSTETRICS & GYNECOLOGY
Payer: COMMERCIAL

## 2022-06-15 ENCOUNTER — OFFICE VISIT (OUTPATIENT)
Dept: OBGYN | Facility: CLINIC | Age: 22
End: 2022-06-15
Attending: OBSTETRICS & GYNECOLOGY
Payer: COMMERCIAL

## 2022-06-15 VITALS
HEIGHT: 67 IN | WEIGHT: 158.3 LBS | BODY MASS INDEX: 24.84 KG/M2 | HEART RATE: 79 BPM | SYSTOLIC BLOOD PRESSURE: 124 MMHG | DIASTOLIC BLOOD PRESSURE: 78 MMHG

## 2022-06-15 VITALS
HEART RATE: 80 BPM | OXYGEN SATURATION: 96 % | BODY MASS INDEX: 25.03 KG/M2 | DIASTOLIC BLOOD PRESSURE: 78 MMHG | RESPIRATION RATE: 12 BRPM | TEMPERATURE: 98.3 F | HEIGHT: 67 IN | WEIGHT: 159.5 LBS | SYSTOLIC BLOOD PRESSURE: 113 MMHG

## 2022-06-15 DIAGNOSIS — D69.3 IDIOPATHIC THROMBOCYTOPENIA (H): Primary | ICD-10-CM

## 2022-06-15 DIAGNOSIS — N92.1 BREAKTHROUGH BLEEDING ON NEXPLANON: Primary | ICD-10-CM

## 2022-06-15 DIAGNOSIS — N92.6 IRREGULAR MENSTRUAL BLEEDING: ICD-10-CM

## 2022-06-15 DIAGNOSIS — Z97.5 BREAKTHROUGH BLEEDING ON NEXPLANON: Primary | ICD-10-CM

## 2022-06-15 PROCEDURE — G0463 HOSPITAL OUTPT CLINIC VISIT: HCPCS | Mod: 27

## 2022-06-15 PROCEDURE — G0463 HOSPITAL OUTPT CLINIC VISIT: HCPCS

## 2022-06-15 PROCEDURE — 99214 OFFICE O/P EST MOD 30 MIN: CPT | Performed by: INTERNAL MEDICINE

## 2022-06-15 PROCEDURE — 99213 OFFICE O/P EST LOW 20 MIN: CPT | Performed by: OBSTETRICS & GYNECOLOGY

## 2022-06-15 ASSESSMENT — PAIN SCALES - GENERAL: PAINLEVEL: NO PAIN (0)

## 2022-06-15 NOTE — PATIENT INSTRUCTIONS
[facility]  [facility]  [facility] [address], [place] [address]  Main: [phone], Fax: [phone]    It was a pleasure seeing you today.  Thank you for allowing us to be involved in your care.  Please let us know if there is anything else we can do for you, so that we can be sure you are leaving completely satisfied with your care experience.    Continue with Tranexamic Acid 1300 mg three times a day. If you are not bleeding you can try 1300 mg twice a day. Please call us with any bleeding issues that you may have.      Patient Education & Resources:  For additional information, please see the following web links:  www.stoptheclot.org, www.clotconnect.org.    Call the [facility]  at [phone].     -If surgeries or procedures are planned (for holding instructions).     -If off anticoagulation, please call during high risk times (long-distance travel, broken bones or trauma, immobilization, surgery, pregnancy, or taking estrogen).     -Any new symptoms of DVT (deep vein thrombosis) or PE (pulmonary embolism)    -pain     -swelling     -redness    -warmth    -shortness of breath    -chest pain    -coughing up blood    We would like a provider on our team to see you at least annually for optimal care and to allow us to continue to prescribe for you.      Return to clinic in  August 10, 2022.  Please schedule return appointment with Dr. Chin.    Your nurse clinician is Rima William [phone].   If they are unavailable and you have immediate concerns, please call [phone] and ask for a nurse.

## 2022-06-15 NOTE — LETTER
"6/15/2022       RE: Telma Freed  [address] [place] [address]     Dear Colleague,    Thank you for referring your patient, Telma Freed, to the [facility] [place] at [facility]. Please see a copy of my visit note below.    SUBJECTIVE   Telma Freed is a 22 year old G0, with PMH of ITP, No LMP recorded. Patient has had an implant., who presents for consultation for AUB.     Telma has had persistent heavy menses because of ITP diagnosed at age 16, followed by Dr. Chin and managed with tranexamic acid currently, with no recent changes in medication.     Telma currently has a Nexplanon implant that was placed in August 2021 by Dr. Montoya, with initial resolution of her AUB until January 2022 when symptoms returned, with persistent bleeding and cramping. She returned in January 2022 for reassessment and began a trial of added ortho cyclen (2 month trial), which worsened her symptoms and was stopped. Prior to Nexplanon she had tried Mirena IUD which was placed in August 2020, with improvement in bleeding but with worsening cramping and cyclic breast pain.     Today she presents with persistent bleeding since January, with cramping and cyclic breast pain. Her cycles during this time have been bleeding x4 weeks interspersed with 2 weeks of no bleeding, followed by another 4 weeks of constant bleeding. Her bleeding is sometimes heavy to 1 super tampon every 2 hours, but this is associated with any changes in taking her regular dose of tranexamic acid.     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 She is in a stable relationship with her current partner for the past 1.5 years and has no concerns about STI testing at this time.     Breast and pelvic exam were offered today and deferred until her next visit.     Report of \"very heavy\" periods: Y, associated with any " change in txa dosing  --Changes protection (pad/tampon/cup) hourly: Y as above  -- Clots > 1 inch: N  Need to change protection during the night: N  Leaking through protection: N  Need to wear double protection: N    Gynecologic History  No LMP recorded. Patient has had an implant.   Menstrual History:  Menstrual History 2021   LAST MENSTRUAL PERIOD 2021   Irregular bleeding due to implant.     Current contraception: Nexplanon implant  Number of partners in last year: 1    No results found for: PAP, last was  per pt  HPV vaccine up to date    Obstetric History  OB History    Para Term  AB Living   0 0 0 0 0 0   SAB IAB Ectopic Multiple Live Births   0 0 0 0 0        Past Medical History  Past Medical History:   Diagnosis Date     Bleeding disorder (H) 2019     Idiopathic thrombocytopenic purpura (H)        Past Surgical History  Past Surgical History:   Procedure Laterality Date     ENT SURGERY         Medications  Current Outpatient Medications   Medication     tranexamic acid (LYSTEDA) 650 MG tablet     Aminocaproic Acid (AMICAR) 1000 MG TABS     Desmopressin Acetate 1.5 MG/ML SOLN     levocetirizine (XYZAL) 5 MG tablet     norgestimate-ethinyl estradiol (ORTHO-CYCLEN) 0.25-35 MG-MCG tablet     No current facility-administered medications for this visit.       Allergies  Allergies   Allergen Reactions     Seasonal Allergies Hives       Social History  Social History     Tobacco Use     Smoking status: Never Smoker     Smokeless tobacco: Never Used   Substance Use Topics     Alcohol use: Yes     Drug use: Never       Family History  Family History   Problem Relation Age of Onset     Breast Cancer Maternal Grandmother      Hypertension Mother      Hypertension Father      Hypertension Maternal Grandfather      Substance Abuse Maternal Grandfather      Hypertension Paternal Grandfather      Substance Abuse Paternal Grandfather      Hypertension Paternal Grandmother      Review of  "Systems  C: NEGATIVE for fever, chills, unplanned weight loss  HEENT: NEGATIVE for vision changes, NEGATIVE for ear, mouth and throat problems  R: NEGATIVE for significant cough or SOB  B: NEGATIVE for masses, tenderness or discharge  CV: NEGATIVE for chest pain, palpitations   GI: NEGATIVE for nausea, abdominal pain, heartburn, or change in bowel habits  : NEGATIVE for frequency, dysuria, or hematuria, or change in bladder habits  M: NEGATIVE for significant arthralgias or myalgia  N: NEGATIVE for weakness, dizziness or paresthesias or headache  E: NEGATIVE for temperature intolerance, skin/hair changes  I: NEGATIVE for worrisome rashes, moles or lesions  P: NEGATIVE for changes in mood or affect    OBJECTIVE   /78 (BP Location: Left arm, Patient Position: Chair)   Pulse 79   Ht 1.702 m (5' 7\")   Wt 71.8 kg (158 lb 4.8 oz)   BMI 24.79 kg/m    BMI: Body mass index is 24.79 kg/m .  General:  Alert, no distress   Head:  Normocephalic, without obvious abnormality   Extremities:  normal     ASSESSMENT   Telma Freed is a 22 year old G0, with PHM of ITP, here today for consultation for AUB- persistent bleeding since January with 2 weeks per month of no bleeding, on Nexplanon implant.     PLAN   AUB with background ITP  - Scheduled TVUS given time since last imaging  - Reviewed possible DMPA  - Continue to follow with hematology to assess need for increased tranexamic acid or additional medical management of ITP  - Consider D&C to reset lining    Patient considering options, RTC after TVUS to review next steps    Mary Pratt  MS3    I appreciate the note above by medical student, Mary Pratt.  I was present with the medical student who participated in the service and in the documentation of the note. I have verified the history and personally performed the physical exam and medical decision making. I agree with the assessment and plan of care as documented in the note.  25 minutes spent on the date of the " encounter doing chart review (previous clinic visits, hospital records, lab results, imaging studies, and procedural documentation) and the patient's history and exam, documentation and further activities as noted above.    Lisette Harrell MD MPH

## 2022-06-15 NOTE — PROGRESS NOTES
Reason for Consultation:  chronic thrombocytopenia with abnormal bleeding     Assessment:  In summary, Telma Freed is a 22 year old woman with no significant past medical history who presented in 2019 after she was diagnosed with mono and developed thrombocytopenia to platelet count of <40K. She was noted to have thrombocytopenia since 2014 and personal history of easy bruising and heavy menstrual bleeding.      1). Chronic thrombocytopenia, likely ITP  2). Easy bruising with normal platelet aggregation and VWF evaluation  3). Low serum ferritin  4). Heavy menstrual bleeding     During her initial evaluation in May 2019 she had a  personal history of easy bruising and had an ISTH bleeding assessment score of 5 (Clara BUSTOS et al. 2014 JTH), in female patients a score of <6 adult female is considered normal. The biggest caveat is that this score is weighed toward VWD not platelet disorders AND patient has not gone through extreme stressors (childbirth, major surgery).     Her mother also has history of easy bruising and heavy menstrual bleeding- raising concern for an inheirted disorder. Her evaluation, which included PFA, VWF screening and platelet aggregometry was normal. She continued to have platelet count in 120 range. Repeat VWF testing including collagen binding, propeptide and 2N testing remained normal. Platelet EM with potential macrothrombocytopenia and decreased alpha granules. However, repeat testing and glycoprotein levels are normal. Currently has she bleeding disorder NOS. It is possible that Telma may have either a collagen defect (William Danlos) or fibrinolytic disorder. Due to insurance coverage along with inability to get nasal DDAVP we have not pursed DDAVP trial. Have discussed sending genetic evaluation- but again insurance coverage is limiting factor.     Currently Telma is managing her menstrual bleeding with tranexamic acid 1300 mg TID. If she misses doses she will have bleeding.      Is working with Gyn as well.     Telma is instructed to contact clinic if bruising or new symptoms worsen.       Plan:  1. Majority of today's visit was spent counseling the patient regarding thrombocytopenia.  2. Continue Tranexamic acid 1300 mg BID  5. Contact clinic if symptoms worsen    The patient is given our center's contact information and is instructed to call if she should have any further questions or concerns.  Otherwise, we will plan on seeing her back Follow up with Provider - August 2022     Yin Chin MD/PhD   of Medicine  [facility] [employer]     ----------------------------------------------------------------------------------------------------------------------     Interval History:  Telma Freed is a 22 year old woman who first presented in May 2019 in consultation due to chronic thrombocytopenia (platelets ~120) with recent worsening due to mononucleosis and lifelong history of easy bruising. Please refer to my initial consult note for further details.     Telma had brief pause in bleeding. She missed dose of medication on her birthday and then started to have bleeding again. Currently controlled somewhat with TXA. She has fatigue- is stressed with studying for bar exam.     Past Medical History:  EBV Mono     Past Surgical History:  Adenoids     Medications:  Current Outpatient Medications   Medication     tranexamic acid (LYSTEDA) 650 MG tablet     levocetirizine (XYZAL) 5 MG tablet     No current facility-administered medications for this visit.        Allergies:       Allergies   Allergen Reactions     Seasonal Allergies Hives        ROS:  A 7 point ROS is negative except as stated in the HPI     Social History:  Denies any tobacco use. Drinks alcohol on weekends. Abstained during her acute illness with mono. Denies any illicit drug use. Will be graduating from [employer] in May- has accepted a position at an Environmental [employer]  "in [place], [place]     Family History:  Mother with easy bruising, heavy bleeding   Younger brother with no bleeding history per patient  Maternal grandmother  Maternal grandfather  Maternal aunt     Patient is not aware of her father's family history    Objective:  /78 (BP Location: Right arm, Patient Position: Sitting, Cuff Size: Adult Regular)   Pulse 80   Temp 98.3  F (36.8  C)   Resp 12   Ht 1.702 m (5' 7\")   Wt 72.3 kg (159 lb 8 oz)   SpO2 96%   BMI 24.98 kg/m      Exam:   Constitutional: Appears well, no distress  HEENT: Pupils equal and reactive to light. No scleral icterus or hemorrhage. Nares without evidence of telangiectasia. Mask in place due to COVID restrictions.   CV: regular rate and rhythm,   Respiratory: clear, no accessory muscle use  Mus/Skele: no edema  Skin: no petechiae, no ecchymosis.  Neuro: CN II-XII intact. Normal gait. AOx3      Labs: personally reviewed with relevant trends annotated below    Bleeding evaluation  The von Willebrand factor antigen (VWF:Ag) was 124% (range %), von Willebrand factor activity (VWF:ACT) was 122% (range %), and Factor 8 levels are within normal limits (130%, range %). Ristocetin Cofactor Activity (VWF:RCo) is normal at 79% (range %). The Factor 8  to VWF:Ag ratio and the VWF:ACT to VWF:Ag ratio are within normal limits.     The  VWF:RCo to VWF:Ag ratio is decreased/borderline low normal.       The distribution of plasma von Willebrand factor multimers is normal (see report from [employer]).     In light of the presence of a  decreased/borderline low normal VWF:RCo  to VWF:Ag ratio, presence of  a Type 2 variant (e.g. Type 2M) and some allele variants not associated with bleeding (Flood et al. Blood 2010;116:280-6 and Flood et al. Blood 2013;121:3742-4) can not be excluded.     VWF propeptide- normal  Collagen binding- normal  Platelet antibodies- negative platelet associated IgG antibodies        Platelet " aggregometry  Arachadonic Acid  See table below     Comment: (Note)   COMMENTS:                       Normal platelet aggregation study.  The patient is noted to have mild   thrombocytopenia and the platelet count of the platelet rich plasma   (PRP) used for testing is mildly decreased. Platelet aggregation   reference ranges have been established for a platelet count of   250x10^9/L in the PRP.  Due to thrombocytopenia in the PRP,   aggregation tracings are interpreted based on ranges reported in   literature (Khai et al. Thromb Haemost 2008;100:134-145). Maximal   platelet aggregation is within normal limits with ADP, Epinephrine,   Collagen, Arachidonic Acid, and low and high doses of Ristocetin.   ATP release is within normal limits with ADP and Thrombin. These   findings are non-diagnostic for a platelet function disorder. If a   platelet function disorder is suspected, consider repeat testing and   electron microscopy.                                       Brandee Medeiros M.D. [phone]                       6/26/2019                                   ATP RELEASE:             ATP Release with ADP:          Normal, 0.87 nm       ATP Release with Thrombin:     Normal, 1.32 nm       AGGREGATION:   Reagent  Concentration    Primary Agg         Maximal Agg   ADP      5 micromolar     Single large wave   Normal, 77%   EPI      10 micromolar    Present             Normal, 72%   COL      2.0 mcg/mL       -------             Normal, 90%   Ar. A.   0.50 mg/mL       -------             Normal, 71%   REFERENCE RANGES:   ATP release with TBN  Greater than or equal to 0.50 nm   ATP release with ADP  Greater than or equal to 0.40 nm   ADP % Agg             Greater than or equal to 64%   EPI % Agg             Greater than or equal to 63%   COL % Agg             Greater than or equal to 66%   AA  % Agg             Greater than or equal to 63%    Ristocetin-4 Conc  (Note)     Comment: COMMENTS:                       See  full platelet aggregation comments.                       Brandee Medeiros M.D. [phone]                       6/26/2019                         RISTOCETIN:   Concentration    Primary Agg        Maximal Agg   0.50 mg/mL       Absent             Normal, 4%     1.00 mg/mL       Present            Normal, 83%   1.25 mg/mL       Single large wave  Normal, 91%   REFERENCE RANGES:   Ristocetin Conc.  % Aggregation   Risto 0.5 mg/mL   Less than or equal to 6%   Risto 1.0 mg/mL   Greater than or equal to 11%   Risto 1.25 mg/mL  Greater than or equal to 76%      Hepatitis B surface and core antibodies were nonreactive  CMV IgG negative  HIV and Hepatitis C negative    EBV DNA was positive with 19,473 DNA copies/mL-- consistent with history of mono  Pargovirus B19 IgG positive, IgM negative    Platelet EM   ELECTRON MICROSCOPIC DESCRIPTION:      PTEM studies are performed. The quality of the EM studies      is adequate.            Whole mount PTEM study demonstrates essentially normal mean      dense body count, 4.5 dense granules per platelet (100      platelets are counted; normal adult range is greater than      or equal to 1.2 dense granules per platelet).  There are no      abnormal dense granules identified. Note: Dense body count      could be falsely low due to inadequate sample      transportation or storage.            Thin section PTEM study demonstrates a subset of apparently      large and round platelets, some showing decreased alpha      granules and increased vacuoles and canalicular networks.      There are no abnormal membranous inclusions in platelets.            Buffy coat EM study shows no inclusions in white blood      cells.     Lab Results   Component Value Date    WBC 4.8 03/09/2022    WBC 4.8 05/05/2021     Lab Results   Component Value Date    RBC 4.61 03/09/2022    RBC 4.60 05/05/2021     Lab Results   Component Value Date    HGB 14.0 03/09/2022    HGB 14.4 05/05/2021     Lab Results   Component  Value Date    HCT 42.0 03/09/2022    HCT 43.1 05/05/2021     Lab Results   Component Value Date    MCV 91 03/09/2022    MCV 94 05/05/2021     Lab Results   Component Value Date    MCH 30.4 03/09/2022    MCH 31.3 05/05/2021     Lab Results   Component Value Date    MCHC 33.3 03/09/2022    MCHC 33.4 05/05/2021     Lab Results   Component Value Date    RDW 11.9 03/09/2022    RDW 12.1 05/05/2021     Lab Results   Component Value Date     03/09/2022     05/05/2021         Ferritin   Date Value Ref Range Status   03/09/2022 28 12 - 150 ng/mL Final   05/05/2021 27 12 - 150 ng/mL Final     Iron   Date Value Ref Range Status   05/17/2022 142 35 - 180 ug/dL Final   05/05/2021 219 (H) 35 - 180 ug/dL Final     Iron Binding Cap   Date Value Ref Range Status   05/05/2021 339 240 - 430 ug/dL Final     Iron Binding Capacity   Date Value Ref Range Status   05/17/2022 373 240 - 430 ug/dL Final

## 2022-06-15 NOTE — PROGRESS NOTES
"SUBJECTIVE   Telma Freed is a 22 year old G0, with PMH of ITP, No LMP recorded. Patient has had an implant., who presents for consultation for AUB.     Telma has had persistent heavy menses because of ITP diagnosed at age 16, followed by Dr. Chin and managed with tranexamic acid currently, with no recent changes in medication.     Telma currently has a Nexplanon implant that was placed in August 2021 by Dr. Montoya, with initial resolution of her AUB until January 2022 when symptoms returned, with persistent bleeding and cramping. She returned in January 2022 for reassessment and began a trial of added ortho cyclen (2 month trial), which worsened her symptoms and was stopped. Prior to Nexplanon she had tried Mirena IUD which was placed in August 2020, with improvement in bleeding but with worsening cramping and cyclic breast pain.     Today she presents with persistent bleeding since January, with cramping and cyclic breast pain. Her cycles during this time have been bleeding x4 weeks interspersed with 2 weeks of no bleeding, followed by another 4 weeks of constant bleeding. Her bleeding is sometimes heavy to 1 super tampon every 2 hours, but this is associated with any changes in taking her regular dose of tranexamic acid.     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 She is in a stable relationship with her current partner for the past 1.5 years and has no concerns about STI testing at this time.     Breast and pelvic exam were offered today and deferred until her next visit.     Report of \"very heavy\" periods: Y, associated with any change in txa dosing  --Changes protection (pad/tampon/cup) hourly: Y as above  -- Clots > 1 inch: N  Need to change protection during the night: N  Leaking through protection: N  Need to wear double protection: N    Gynecologic History  No LMP recorded. Patient has had an implant.   Menstrual History:  Menstrual " History 2021   LAST MENSTRUAL PERIOD 2021   Irregular bleeding due to implant.     Current contraception: Nexplanon implant  Number of partners in last year: 1    No results found for: PAP, last was  per pt  HPV vaccine up to date    Obstetric History  OB History    Para Term  AB Living   0 0 0 0 0 0   SAB IAB Ectopic Multiple Live Births   0 0 0 0 0        Past Medical History  Past Medical History:   Diagnosis Date     Bleeding disorder (H)      Idiopathic thrombocytopenic purpura (H)        Past Surgical History  Past Surgical History:   Procedure Laterality Date     ENT SURGERY         Medications  Current Outpatient Medications   Medication     tranexamic acid (LYSTEDA) 650 MG tablet     Aminocaproic Acid (AMICAR) 1000 MG TABS     Desmopressin Acetate 1.5 MG/ML SOLN     levocetirizine (XYZAL) 5 MG tablet     norgestimate-ethinyl estradiol (ORTHO-CYCLEN) 0.25-35 MG-MCG tablet     No current facility-administered medications for this visit.       Allergies  Allergies   Allergen Reactions     Seasonal Allergies Hives       Social History  Social History     Tobacco Use     Smoking status: Never Smoker     Smokeless tobacco: Never Used   Substance Use Topics     Alcohol use: Yes     Drug use: Never       Family History  Family History   Problem Relation Age of Onset     Breast Cancer Maternal Grandmother      Hypertension Mother      Hypertension Father      Hypertension Maternal Grandfather      Substance Abuse Maternal Grandfather      Hypertension Paternal Grandfather      Substance Abuse Paternal Grandfather      Hypertension Paternal Grandmother      Review of Systems  C: NEGATIVE for fever, chills, unplanned weight loss  HEENT: NEGATIVE for vision changes, NEGATIVE for ear, mouth and throat problems  R: NEGATIVE for significant cough or SOB  B: NEGATIVE for masses, tenderness or discharge  CV: NEGATIVE for chest pain, palpitations   GI: NEGATIVE for nausea, abdominal pain,  "heartburn, or change in bowel habits  : NEGATIVE for frequency, dysuria, or hematuria, or change in bladder habits  M: NEGATIVE for significant arthralgias or myalgia  N: NEGATIVE for weakness, dizziness or paresthesias or headache  E: NEGATIVE for temperature intolerance, skin/hair changes  I: NEGATIVE for worrisome rashes, moles or lesions  P: NEGATIVE for changes in mood or affect    OBJECTIVE   /78 (BP Location: Left arm, Patient Position: Chair)   Pulse 79   Ht 1.702 m (5' 7\")   Wt 71.8 kg (158 lb 4.8 oz)   BMI 24.79 kg/m    BMI: Body mass index is 24.79 kg/m .  General:  Alert, no distress   Head:  Normocephalic, without obvious abnormality   Extremities:  normal     ASSESSMENT   Telma Freed is a 22 year old G0, with PHM of ITP, here today for consultation for AUB- persistent bleeding since January with 2 weeks per month of no bleeding, on Nexplanon implant.     PLAN   AUB with background ITP  - Scheduled TVUS given time since last imaging  - Reviewed possible DMPA  - Continue to follow with hematology to assess need for increased tranexamic acid or additional medical management of ITP  - Consider D&C to reset lining    Patient considering options, RTC after TVUS to review next steps    Mary Pratt  MS3    I appreciate the note above by medical student, Mary Pratt.  I was present with the medical student who participated in the service and in the documentation of the note. I have verified the history and personally performed the physical exam and medical decision making. I agree with the assessment and plan of care as documented in the note.  25 minutes spent on the date of the encounter doing chart review (previous clinic visits, hospital records, lab results, imaging studies, and procedural documentation) and the patient's history and exam, documentation and further activities as noted above.    Lisette Harrell MD MPH        "

## 2022-08-10 ENCOUNTER — OFFICE VISIT (OUTPATIENT)
Dept: HEMATOLOGY | Facility: CLINIC | Age: 22
End: 2022-08-10
Attending: OBSTETRICS & GYNECOLOGY
Payer: COMMERCIAL

## 2022-08-10 ENCOUNTER — OFFICE VISIT (OUTPATIENT)
Dept: OBGYN | Facility: CLINIC | Age: 22
End: 2022-08-10
Attending: OBSTETRICS & GYNECOLOGY
Payer: COMMERCIAL

## 2022-08-10 ENCOUNTER — ANCILLARY PROCEDURE (OUTPATIENT)
Dept: ULTRASOUND IMAGING | Facility: CLINIC | Age: 22
End: 2022-08-10
Attending: OBSTETRICS & GYNECOLOGY
Payer: COMMERCIAL

## 2022-08-10 VITALS
DIASTOLIC BLOOD PRESSURE: 76 MMHG | HEIGHT: 67 IN | SYSTOLIC BLOOD PRESSURE: 121 MMHG | WEIGHT: 154.4 LBS | BODY MASS INDEX: 24.23 KG/M2 | HEART RATE: 63 BPM

## 2022-08-10 VITALS — WEIGHT: 155.1 LBS | BODY MASS INDEX: 24.29 KG/M2

## 2022-08-10 DIAGNOSIS — N83.202 CYST OF LEFT OVARY: ICD-10-CM

## 2022-08-10 DIAGNOSIS — Z97.5 BREAKTHROUGH BLEEDING ON NEXPLANON: ICD-10-CM

## 2022-08-10 DIAGNOSIS — N92.1 BREAKTHROUGH BLEEDING ON NEXPLANON: ICD-10-CM

## 2022-08-10 DIAGNOSIS — L90.0 LICHEN SCLEROSUS: Primary | ICD-10-CM

## 2022-08-10 DIAGNOSIS — L70.0 CYSTIC ACNE VULGARIS: ICD-10-CM

## 2022-08-10 DIAGNOSIS — D69.9 BLEEDING DISORDER (H): Primary | ICD-10-CM

## 2022-08-10 DIAGNOSIS — D69.3 IDIOPATHIC THROMBOCYTOPENIA (H): ICD-10-CM

## 2022-08-10 DIAGNOSIS — N93.9 ABNORMAL UTERINE BLEEDING (AUB): ICD-10-CM

## 2022-08-10 PROCEDURE — G0463 HOSPITAL OUTPT CLINIC VISIT: HCPCS

## 2022-08-10 PROCEDURE — 76830 TRANSVAGINAL US NON-OB: CPT | Mod: 26 | Performed by: OBSTETRICS & GYNECOLOGY

## 2022-08-10 PROCEDURE — 99214 OFFICE O/P EST MOD 30 MIN: CPT | Performed by: OBSTETRICS & GYNECOLOGY

## 2022-08-10 PROCEDURE — 76830 TRANSVAGINAL US NON-OB: CPT

## 2022-08-10 PROCEDURE — G0463 HOSPITAL OUTPT CLINIC VISIT: HCPCS | Mod: 27

## 2022-08-10 PROCEDURE — 99213 OFFICE O/P EST LOW 20 MIN: CPT | Performed by: INTERNAL MEDICINE

## 2022-08-10 RX ORDER — CLOBETASOL PROPIONATE 0.5 MG/G
OINTMENT TOPICAL 2 TIMES DAILY
Qty: 45 G | Refills: 4 | Status: SHIPPED | OUTPATIENT
Start: 2022-08-10

## 2022-08-10 RX ORDER — CLINDAMYCIN PHOSPHATE 10 UG/ML
LOTION TOPICAL 2 TIMES DAILY
Qty: 60 ML | Refills: 4 | Status: SHIPPED | OUTPATIENT
Start: 2022-08-10

## 2022-08-10 NOTE — NURSING NOTE
Completed vital signs, reviewed allergies and medications for Telma Freed.  Contact card provided for follow up questions or concerns.    Rima PEREZN, RN   Nurse Clinician   [facility]  Office: [phone]  Main Office: [phone] (ask to speak with a nurse)  Fax: [phone]

## 2022-08-10 NOTE — PROGRESS NOTES
Reason for Consultation:  chronic thrombocytopenia with abnormal bleeding     Assessment:  In summary, Telma Freed is a 22 year old woman with no significant past medical history who presented in 2019 after she was diagnosed with mono and developed thrombocytopenia to platelet count of <40K. She was noted to have thrombocytopenia since 2014 and personal history of easy bruising and heavy menstrual bleeding.      1). Chronic thrombocytopenia, likely ITP  2). Easy bruising with normal platelet aggregation and VWF evaluation  3). Low serum ferritin  4). Heavy menstrual bleeding     During her initial evaluation in May 2019 she had a  personal history of easy bruising and had an ISTH bleeding assessment score of 5 (Clara BUSTOS et al. 2014 JTH), in female patients a score of <6 adult female is considered normal. The biggest caveat is that this score is weighed toward VWD not platelet disorders AND patient has not gone through extreme stressors (childbirth, major surgery).     Her mother also has history of easy bruising and heavy menstrual bleeding- raising concern for an inheirted disorder. Her evaluation, which included PFA, VWF screening and platelet aggregometry was normal. She continued to have platelet count in 120 range. Repeat VWF testing including collagen binding, propeptide and 2N testing remained normal. Platelet EM with potential macrothrombocytopenia and decreased alpha granules. However, repeat testing and glycoprotein levels are normal. Currently has she bleeding disorder NOS. It is possible that Telma may have either a collagen defect (William Danlos) or fibrinolytic disorder. Due to insurance coverage along with inability to get nasal DDAVP we have not pursed DDAVP trial. Have discussed sending genetic evaluation- but again insurance coverage is limiting factor.     Currently Telma is managing her menstrual bleeding with tranexamic acid 1300 mg TID. If she misses doses she will have bleeding.      Is working with Gyn as well. Discussed that elthrombopag (Promacta) may be a strategy to increase her platelet count. Will send test claim and if insurance covers will add Promacta 25 mg daily.     Telma is instructed to contact clinic if bruising or new symptoms worsen.       Plan:  1. Majority of today's visit was spent counseling the patient regarding thrombocytopenia.  2. Continue Tranexamic acid 1300 mg BID  3. Contact clinic if symptoms worsen  4. Promacta 25 mg daily      The patient is given our center's contact information and is instructed to call if she should have any further questions or concerns.  Otherwise, we will plan on seeing her back Follow up with Provider - TBD by insurance, etc     Yin Chin MD/PhD   of Medicine  [facility] [employer]     ----------------------------------------------------------------------------------------------------------------------     Interval History:  Telma Freed is a 22 year old woman who first presented in May 2019 in consultation due to chronic thrombocytopenia (platelets ~120) with recent worsening due to mononucleosis and lifelong history of easy bruising. Please refer to my initial consult note for further details.     Telma noted that is she misses dose of TXA she has heavier bleeding. Currently in between periods. No issues with bruising. Took bar and is preparing to move.      Past Medical History:  EBV Mono     Past Surgical History:  Adenoids     Medications:  Current Outpatient Medications   Medication     clindamycin (CLEOCIN T) 1 % external lotion     clobetasol (TEMOVATE) 0.05 % external ointment     tranexamic acid (LYSTEDA) 650 MG tablet     No current facility-administered medications for this visit.        Allergies:       Allergies   Allergen Reactions     Seasonal Allergies Hives        ROS:  A 7 point ROS is negative except as stated in the HPI     Social History:  Denies any tobacco  use. Drinks alcohol on weekends. Abstained during her acute illness with mono. Denies any illicit drug use. Graduated from [employer] in May- has accepted a position at an [employer] in [place], [place]     Family History:  Mother with easy bruising, heavy bleeding   Younger brother with no bleeding history per patient  Maternal grandmother  Maternal grandfather  Maternal aunt     Patient is not aware of her father's family history    Objective:  Wt 70.4 kg (155 lb 1.6 oz)   BMI 24.29 kg/m      Exam:   Constitutional: Appears well, no distress  HEENT: Pupils equal and reactive to light. No scleral icterus or hemorrhage. Nares without evidence of telangiectasia. Mask in place due to COVID restrictions.   CV: regular rate and rhythm,   Respiratory: clear, no accessory muscle use  Mus/Skele: no edema  Skin: no petechiae, no ecchymosis.  Neuro: CN II-XII intact. Normal gait. AOx3      Labs: personally reviewed with relevant trends annotated below    Bleeding evaluation  The von Willebrand factor antigen (VWF:Ag) was 124% (range %), von Willebrand factor activity (VWF:ACT) was 122% (range %), and Factor 8 levels are within normal limits (130%, range %). Ristocetin Cofactor Activity (VWF:RCo) is normal at 79% (range %). The Factor 8  to VWF:Ag ratio and the VWF:ACT to VWF:Ag ratio are within normal limits.     The  VWF:RCo to VWF:Ag ratio is decreased/borderline low normal.       The distribution of plasma von Willebrand factor multimers is normal (see report from [employer]).     In light of the presence of a  decreased/borderline low normal VWF:RCo  to VWF:Ag ratio, presence of  a Type 2 variant (e.g. Type 2M) and some allele variants not associated with bleeding (Flood et al. Blood 2010;116:280-6 and Flood et al. Blood 2013;121:3742-4) can not be excluded.     VWF propeptide- normal  Collagen binding- normal  Platelet antibodies- negative platelet associated IgG  antibodies        Platelet aggregometry  Arachadonic Acid  See table below     Comment: (Note)   COMMENTS:                       Normal platelet aggregation study.  The patient is noted to have mild   thrombocytopenia and the platelet count of the platelet rich plasma   (PRP) used for testing is mildly decreased. Platelet aggregation   reference ranges have been established for a platelet count of   250x10^9/L in the PRP.  Due to thrombocytopenia in the PRP,   aggregation tracings are interpreted based on ranges reported in   literature (Khai et al. Thromb Haemost 2008;100:134-145). Maximal   platelet aggregation is within normal limits with ADP, Epinephrine,   Collagen, Arachidonic Acid, and low and high doses of Ristocetin.   ATP release is within normal limits with ADP and Thrombin. These   findings are non-diagnostic for a platelet function disorder. If a   platelet function disorder is suspected, consider repeat testing and   electron microscopy.                                       Brandee Medeiros M.D. [phone]                       6/26/2019                                   ATP RELEASE:             ATP Release with ADP:          Normal, 0.87 nm       ATP Release with Thrombin:     Normal, 1.32 nm       AGGREGATION:   Reagent  Concentration    Primary Agg         Maximal Agg   ADP      5 micromolar     Single large wave   Normal, 77%   EPI      10 micromolar    Present             Normal, 72%   COL      2.0 mcg/mL       -------             Normal, 90%   Ar. A.   0.50 mg/mL       -------             Normal, 71%   REFERENCE RANGES:   ATP release with TBN  Greater than or equal to 0.50 nm   ATP release with ADP  Greater than or equal to 0.40 nm   ADP % Agg             Greater than or equal to 64%   EPI % Agg             Greater than or equal to 63%   COL % Agg             Greater than or equal to 66%   AA  % Agg             Greater than or equal to 63%    Ristocetin-4 Conc  (Note)     Comment: COMMENTS:                        See full platelet aggregation comments.                       Brandee Medeiros M.D. [phone]                       6/26/2019                         RISTOCETIN:   Concentration    Primary Agg        Maximal Agg   0.50 mg/mL       Absent             Normal, 4%     1.00 mg/mL       Present            Normal, 83%   1.25 mg/mL       Single large wave  Normal, 91%   REFERENCE RANGES:   Ristocetin Conc.  % Aggregation   Risto 0.5 mg/mL   Less than or equal to 6%   Risto 1.0 mg/mL   Greater than or equal to 11%   Risto 1.25 mg/mL  Greater than or equal to 76%      Hepatitis B surface and core antibodies were nonreactive  CMV IgG negative  HIV and Hepatitis C negative    EBV DNA was positive with 19,473 DNA copies/mL-- consistent with history of mono  Pargovirus B19 IgG positive, IgM negative    Platelet EM   ELECTRON MICROSCOPIC DESCRIPTION:      PTEM studies are performed. The quality of the EM studies      is adequate.            Whole mount PTEM study demonstrates essentially normal mean      dense body count, 4.5 dense granules per platelet (100      platelets are counted; normal adult range is greater than      or equal to 1.2 dense granules per platelet).  There are no      abnormal dense granules identified. Note: Dense body count      could be falsely low due to inadequate sample      transportation or storage.            Thin section PTEM study demonstrates a subset of apparently      large and round platelets, some showing decreased alpha      granules and increased vacuoles and canalicular networks.      There are no abnormal membranous inclusions in platelets.            Buffy coat EM study shows no inclusions in white blood      cells.     Lab Results   Component Value Date    WBC 4.8 03/09/2022    WBC 4.8 05/05/2021     Lab Results   Component Value Date    RBC 4.61 03/09/2022    RBC 4.60 05/05/2021     Lab Results   Component Value Date    HGB 14.0 03/09/2022    HGB 14.4 05/05/2021      Lab Results   Component Value Date    HCT 42.0 03/09/2022    HCT 43.1 05/05/2021     Lab Results   Component Value Date    MCV 91 03/09/2022    MCV 94 05/05/2021     Lab Results   Component Value Date    MCH 30.4 03/09/2022    MCH 31.3 05/05/2021     Lab Results   Component Value Date    MCHC 33.3 03/09/2022    MCHC 33.4 05/05/2021     Lab Results   Component Value Date    RDW 11.9 03/09/2022    RDW 12.1 05/05/2021     Lab Results   Component Value Date     03/09/2022     05/05/2021         Ferritin   Date Value Ref Range Status   03/09/2022 28 12 - 150 ng/mL Final   05/05/2021 27 12 - 150 ng/mL Final     Iron   Date Value Ref Range Status   05/17/2022 142 35 - 180 ug/dL Final   05/05/2021 219 (H) 35 - 180 ug/dL Final     Iron Binding Cap   Date Value Ref Range Status   05/05/2021 339 240 - 430 ug/dL Final     Iron Binding Capacity   Date Value Ref Range Status   05/17/2022 373 240 - 430 ug/dL Final

## 2022-08-10 NOTE — LETTER
8/10/2022       RE: Telma Freed  [address] [place] [address]     Dear Colleague,    Thank you for referring your patient, Telma Freed, to the [facility] [place] [facility]. Please see a copy of my visit note below.    SUBJECTIVE   Telma Freed is a 22 year old G0 with h/o ITP who presents for follow up of AUB and acute flare of vulvar lichen sclerosis.    Telma's persistent heavy menses 2/2 ITP (TXA, Dr. Chin) are relatively unchanged since her last visit. She states her most recent period started ~1 month ago and lasted for 3 weeks in total although there were several days during those 3 weeks her bleeding was minimal or stopped completely, only to start again 1-2 days later. She has not had any bleeding for the past week.    Her cramps were significantly improved and her cyclic breast pain was minimal throughout her last cycle. She believes both of these symptoms are manageable at this time.     She mentions the most frustrating part of her cycles is the 'unpredictability' regarding both duration and frequency of her periods, but that she is currently satisfied with her treatment plan (Nexplanon, TXA)  and is not interested in exploring other treatments at this time.     Telma reports that she was diagnosed with vulvar lichen sclerosus as a child and recently has been having a flare. She has been experiencing vulvar itching and burning for the past several days that will sometimes bleed either from scratching in her sleep or vaginal intercourse. She also states the skin of the vulva looks 'grey/white, and scaly, like the tissue is dying'. She has never used medications for this in the past but is interested in exploring treatment options.     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     Specific concerns today:  Onset - ~1 week, has occurred in the past and  "dx with lichen sclerosus as child  Pain description - burn, itch  Location - Bilateral labia  Rating - NA  Alleviating - None  Aggravating - None  Associated sx - Bleeding with scratch/vaginal intercourse    Report of \"very heavy\" periods: Y  --Changes protection (pad/tampon/cup) hourly: Y  -- Clots > 1 inch: N  Need to change protection during the night: N  Leaking through protection: N  Need to wear double protection: N    Gynecologic History  Menstrual History:  Menstrual History 2021   LAST MENSTRUAL PERIOD 2022   Irregular bleeding 2/2 ITP  Current contraception: Nexplanon Implant    No results found for: PAP , last pap in  per pt report  History of abnormal Pap smear: No, per pt report  HPV vaccine UTD    Obstetric History  OB History    Para Term  AB Living   0 0 0 0 0 0   SAB IAB Ectopic Multiple Live Births   0 0 0 0 0      Past Medical History  Past Medical History:   Diagnosis Date     Bleeding disorder (H) 2019     Idiopathic thrombocytopenic purpura (H)      Lichen sclerosus      Past Surgical History  Past Surgical History:   Procedure Laterality Date     ENT SURGERY       Medications  Current Outpatient Medications   Medication     clindamycin (CLEOCIN T) 1 % external lotion     clobetasol (TEMOVATE) 0.05 % external ointment     tranexamic acid (LYSTEDA) 650 MG tablet     No current facility-administered medications for this visit.     Allergies  Allergies   Allergen Reactions     Seasonal Allergies Hives     Social History  Social History     Tobacco Use     Smoking status: Never Smoker     Smokeless tobacco: Never Used   Substance Use Topics     Alcohol use: Yes     Drug use: Never     Family History  Family History   Problem Relation Age of Onset     Breast Cancer Maternal Grandmother      Hypertension Mother      Hypertension Father      Hypertension Maternal Grandfather      Substance Abuse Maternal Grandfather      Hypertension Paternal Grandfather      Substance " "Abuse Paternal Grandfather      Hypertension Paternal Grandmother        OBJECTIVE   /76 (BP Location: Left arm, Patient Position: Chair)   Pulse 63   Ht 1.702 m (5' 7\")   Wt 70 kg (154 lb 6.4 oz)   BMI 24.18 kg/m    BMI: Body mass index is 24.18 kg/m .  General:  Alert, no distress   Head:  Normocephalic, without obvious abnormality   Pelvic: External exam:  Minor retractions of anterior labia, consistent with early lichenification  Small fissure with friable tissue at posterior fourchette    Speculum exam: Deferred    Bimanual exam: Deferred   Extremities:  normal     Imaging:   Pelvic ultrasound: Done 8/10/22   Results:   -Normal anatomic structures, no uterine fibroids, polyps or other masses.    -Uterine size WNL for nulliparous female (8.22cm x 5.73cm x 4.28cm)   -Endometrial lining WNL for age (4.75mm).    -R ovary size WNL for age (3.38cm x 1.53cm x 1.50cm), no cysts or masses    -L ovary with simple hypoechoic denisty, presumable cyst     -L Ovary: 4.48cm x 3.16cm x 1.99cm    -L Cyst:  3.23cm x 2.02cm x 2.64cm    ASSESSMENT   Telma Freed is a 22 year old G0, here today for follow up of AUB 2/2 ITP and acute vulvar irritation over the past week.     Telma was diagnosed with vulvar lichen sclerosus as child. History and physical consistent with acute flare of vulvar lichen sclerosus. Will treat for lichen sclerosus and re-evaluate for non-resolving symptoms.     AUB relatively unchanged since last clinic visit. Currently 1 week of no bleeding after 3 weeks of bleeding with Nexplanon implant,TXA. No evidence for anatomic abnormality, including fibroids/polyps, on imaging. Endometrial lining WNL, not indicative of hyperplasia.     PLAN   (L90.0) Lichen sclerosus   -clobetasol (TEMOVATE) 0.05 % external ointment apply BID for 2-4 weeks  -reviewed chronic nature, need for maintenance  -adequate lubrication during intercourse/penetration        (L70.0) Cystic acne vulgaris  -clindamycin (CLEOCIN " T) 1 % external lotion apply BID prn   - refill per request        (N93.9) Abnormal uterine bleeding (AUB) 2/2 ITP  -Continue Nexplanon and TXA   -Continue to follow with Heme  -Reviewed DMPA and D&C, patient declines at this time     (N83.202) Cyst of left ovary   -Repeat TVUS in 1-2 months to evaluate growth/stability/resolution     -RTC prn prior to out of state move   -Establish care with new OB/GYN after move     Berkley Rojo, MS3  [employer] [employer] [facility] [employer]     I appreciate the note above by medical student, Berkley Rojo.  I was present with the medical student who participated in the service and in the documentation of the note. I have verified the history and personally performed the physical exam and medical decision making. I agree with the assessment and plan of care as documented in the note.  25 minutes spent on the date of the encounter doing chart review (previous clinic visits, hospital records, lab results, imaging studies, and procedural documentation) and the patient's history and exam, documentation and further activities as noted above.    Lisette Harrell MD MPH

## 2022-08-10 NOTE — PATIENT INSTRUCTIONS
[facility]  [facility]  Formerly named [facility] [address], [place] [id number]  Main: [phone], Fax: [phone]    It was a pleasure seeing you today.  Thank you for allowing us to be involved in your care.  Please let us know if there is anything else we can do for you, so that we can be sure you are leaving completely satisfied with your care experience.     We will work to get you a TPO-mimetic which is a medication to increase your platelets count with a goal of >200. The medications option we have are eltrombopag or avatrombopag    Continue the tranexamic acid 1300 mg three times a day.      Patient Education & Resources:  For additional information, please see the following web links:  www.stoptheclot.org, www.clotconnect.org.    Call the [facility]  at [phone].     -If surgeries or procedures are planned (for holding instructions).     -If off anticoagulation, please call during high risk times (long-distance travel, broken bones or trauma, immobilization, surgery, pregnancy, or taking estrogen).     -Any new symptoms of DVT (deep vein thrombosis) or PE (pulmonary embolism)    -pain     -swelling     -redness    -warmth    -shortness of breath    -chest pain    -coughing up blood    We would like a provider on our team to see you at least annually for optimal care and to allow us to continue to prescribe for you.  Our physician assistants that are specialized in bleeding and clotting disorders that you may be able to see more readily.    Return to clinic in  as needed.  Please schedule return appointment with Dr. Chin.    Your nurse clinician is Rima William [phone].   If they are unavailable and you have immediate concerns, please call [phone] and ask for a nurse.

## 2022-08-10 NOTE — PROGRESS NOTES
"SUBJECTIVE   Telma Freed is a 22 year old G0 with h/o ITP who presents for follow up of AUB and acute flare of vulvar lichen sclerosis.    Telma's persistent heavy menses 2/2 ITP (TXA, Dr. Chin) are relatively unchanged since her last visit. She states her most recent period started ~1 month ago and lasted for 3 weeks in total although there were several days during those 3 weeks her bleeding was minimal or stopped completely, only to start again 1-2 days later. She has not had any bleeding for the past week.    Her cramps were significantly improved and her cyclic breast pain was minimal throughout her last cycle. She believes both of these symptoms are manageable at this time.     She mentions the most frustrating part of her cycles is the 'unpredictability' regarding both duration and frequency of her periods, but that she is currently satisfied with her treatment plan (Nexplanon, TXA)  and is not interested in exploring other treatments at this time.     Telma reports that she was diagnosed with vulvar lichen sclerosus as a child and recently has been having a flare. She has been experiencing vulvar itching and burning for the past several days that will sometimes bleed either from scratching in her sleep or vaginal intercourse. She also states the skin of the vulva looks 'grey/white, and scaly, like the tissue is dying'. She has never used medications for this in the past but is interested in exploring treatment options.     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     Specific concerns today:  Onset - ~1 week, has occurred in the past and dx with lichen sclerosus as child  Pain description - burn, itch  Location - Bilateral labia  Rating - NA  Alleviating - None  Aggravating - None  Associated sx - Bleeding with scratch/vaginal intercourse    Report of \"very heavy\" periods: Y  --Changes protection (pad/tampon/cup) hourly: Y  -- Clots > 1 inch: " "N  Need to change protection during the night: N  Leaking through protection: N  Need to wear double protection: N    Gynecologic History  Menstrual History:  Menstrual History 2021   LAST MENSTRUAL PERIOD 2022   Irregular bleeding 2/2 ITP  Current contraception: Nexplanon Implant    No results found for: PAP , last pap in  per pt report  History of abnormal Pap smear: No, per pt report  HPV vaccine UTD    Obstetric History  OB History    Para Term  AB Living   0 0 0 0 0 0   SAB IAB Ectopic Multiple Live Births   0 0 0 0 0      Past Medical History  Past Medical History:   Diagnosis Date     Bleeding disorder (H) 2019     Idiopathic thrombocytopenic purpura (H)      Lichen sclerosus      Past Surgical History  Past Surgical History:   Procedure Laterality Date     ENT SURGERY       Medications  Current Outpatient Medications   Medication     clindamycin (CLEOCIN T) 1 % external lotion     clobetasol (TEMOVATE) 0.05 % external ointment     tranexamic acid (LYSTEDA) 650 MG tablet     No current facility-administered medications for this visit.     Allergies  Allergies   Allergen Reactions     Seasonal Allergies Hives     Social History  Social History     Tobacco Use     Smoking status: Never Smoker     Smokeless tobacco: Never Used   Substance Use Topics     Alcohol use: Yes     Drug use: Never     Family History  Family History   Problem Relation Age of Onset     Breast Cancer Maternal Grandmother      Hypertension Mother      Hypertension Father      Hypertension Maternal Grandfather      Substance Abuse Maternal Grandfather      Hypertension Paternal Grandfather      Substance Abuse Paternal Grandfather      Hypertension Paternal Grandmother        OBJECTIVE   /76 (BP Location: Left arm, Patient Position: Chair)   Pulse 63   Ht 1.702 m (5' 7\")   Wt 70 kg (154 lb 6.4 oz)   BMI 24.18 kg/m    BMI: Body mass index is 24.18 kg/m .  General:  Alert, no distress   Head:  " Normocephalic, without obvious abnormality   Pelvic: External exam:  Minor retractions of anterior labia, consistent with early lichenification  Small fissure with friable tissue at posterior fourchette    Speculum exam: Deferred    Bimanual exam: Deferred   Extremities:  normal     Imaging:   Pelvic ultrasound: Done 8/10/22   Results:   -Normal anatomic structures, no uterine fibroids, polyps or other masses.    -Uterine size WNL for nulliparous female (8.22cm x 5.73cm x 4.28cm)   -Endometrial lining WNL for age (4.75mm).    -R ovary size WNL for age (3.38cm x 1.53cm x 1.50cm), no cysts or masses    -L ovary with simple hypoechoic denisty, presumable cyst     -L Ovary: 4.48cm x 3.16cm x 1.99cm    -L Cyst:  3.23cm x 2.02cm x 2.64cm    ASSESSMENT   Telma Freed is a 22 year old G0, here today for follow up of AUB 2/2 ITP and acute vulvar irritation over the past week.     Telma was diagnosed with vulvar lichen sclerosus as child. History and physical consistent with acute flare of vulvar lichen sclerosus. Will treat for lichen sclerosus and re-evaluate for non-resolving symptoms.     AUB relatively unchanged since last clinic visit. Currently 1 week of no bleeding after 3 weeks of bleeding with Nexplanon implant,TXA. No evidence for anatomic abnormality, including fibroids/polyps, on imaging. Endometrial lining WNL, not indicative of hyperplasia.     PLAN   (L90.0) Lichen sclerosus   -clobetasol (TEMOVATE) 0.05 % external ointment apply BID for 2-4 weeks  -reviewed chronic nature, need for maintenance  -adequate lubrication during intercourse/penetration        (L70.0) Cystic acne vulgaris  -clindamycin (CLEOCIN T) 1 % external lotion apply BID prn   - refill per request        (N93.9) Abnormal uterine bleeding (AUB) 2/2 ITP  -Continue Nexplanon and TXA   -Continue to follow with Heme  -Reviewed DMPA and D&C, patient declines at this time     (N83.202) Cyst of left ovary   -Repeat TVUS in 1-2 months to evaluate  growth/stability/resolution     -RTC prn prior to out of state move   -Establish care with new OB/GYN after move     Berkley Rojo, MS3  [employer]     I appreciate the note above by medical student, Berkley Rojo.  I was present with the medical student who participated in the service and in the documentation of the note. I have verified the history and personally performed the physical exam and medical decision making. I agree with the assessment and plan of care as documented in the note.  25 minutes spent on the date of the encounter doing chart review (previous clinic visits, hospital records, lab results, imaging studies, and procedural documentation) and the patient's history and exam, documentation and further activities as noted above.    Lisette Harrell MD MPH

## 2022-08-24 ENCOUNTER — DOCUMENTATION ONLY (OUTPATIENT)
Dept: HEMATOLOGY | Facility: CLINIC | Age: 22
End: 2022-08-24

## 2022-08-24 NOTE — PROGRESS NOTES
Prior Authorization In Process  CoverMyMeds Key: [id number]    Date: 8/25/22  PA denied due to lack of documentation on attempting a corticosteroid or immunoglobulin. We see a note from Giuseppe in June 2020 mentioning trying dexamethasone but cannot find documentation of prescription or CBC result from that time. Will re-attempt if we can obtain this documentation - Telma moving to [place] on August 28, 2022 and will likely have different insurance.              Date: 8/24/22  Submitted for PA through [email or web address] [facility]

## 2022-09-02 DIAGNOSIS — D69.3 IDIOPATHIC THROMBOCYTOPENIC PURPURA (H): ICD-10-CM

## 2022-09-02 RX ORDER — TRANEXAMIC ACID 650 MG/1
1300 TABLET ORAL 3 TIMES DAILY
Qty: 180 TABLET | Refills: 3 | OUTPATIENT
Start: 2022-09-02

## 2022-09-06 DIAGNOSIS — D69.3 IDIOPATHIC THROMBOCYTOPENIC PURPURA (H): ICD-10-CM

## 2022-09-06 RX ORDER — TRANEXAMIC ACID 650 MG/1
1300 TABLET ORAL 3 TIMES DAILY
Qty: 180 TABLET | Refills: 3 | Status: SHIPPED | OUTPATIENT
Start: 2022-09-06

## 2022-09-06 NOTE — PROGRESS NOTES
Telma left a message on the nurseline requesting a refill of her TXA as she is almost out.    Per Dr. Chin's 8/10/22 clinic note, Telma is to continue with 1300 BID of TXA.    Tomeka PEREZN, RN, PHN   [facility]   Office: [phone]  Fax: [phone]

## 2022-09-18 ENCOUNTER — HEALTH MAINTENANCE LETTER (OUTPATIENT)
Age: 22
End: 2022-09-18

## 2022-09-19 ENCOUNTER — MYC MEDICAL ADVICE (OUTPATIENT)
Dept: HEMATOLOGY | Facility: CLINIC | Age: 22
End: 2022-09-19

## 2022-09-29 ENCOUNTER — MYC MEDICAL ADVICE (OUTPATIENT)
Dept: HEMATOLOGY | Facility: CLINIC | Age: 22
End: 2022-09-29

## 2022-10-03 ENCOUNTER — TELEPHONE (OUTPATIENT)
Dept: HEMATOLOGY | Facility: CLINIC | Age: 22
End: 2022-10-03

## 2022-10-03 ENCOUNTER — MYC MEDICAL ADVICE (OUTPATIENT)
Dept: HEMATOLOGY | Facility: CLINIC | Age: 22
End: 2022-10-03

## 2022-10-03 DIAGNOSIS — D69.3 IDIOPATHIC THROMBOCYTOPENIC PURPURA (H): ICD-10-CM

## 2022-10-03 DIAGNOSIS — N92.6 IRREGULAR MENSTRUAL BLEEDING: Primary | ICD-10-CM

## 2022-10-03 RX ORDER — MECLOFENAMATE SODIUM 100 MG/1
100 CAPSULE ORAL 3 TIMES DAILY
Qty: 18 CAPSULE | Refills: 3 | Status: SHIPPED | OUTPATIENT
Start: 2022-10-03 | End: 2022-10-04

## 2022-10-03 RX ORDER — MECLOFENAMATE SODIUM 100 MG/1
100 CAPSULE ORAL 3 TIMES DAILY
Qty: 18 CAPSULE | Refills: 0 | Status: SHIPPED | OUTPATIENT
Start: 2022-10-03 | End: 2022-10-03

## 2022-10-03 NOTE — TELEPHONE ENCOUNTER
RN called Telma regarding Promacta denial.     Next steps are to write a letter of medical necessity to send for an external review.This letter will need to be faxed to the appeals department for external review. Their fax number is [phone].     We can also trial a free trial of similar drug - Doptelet. This is a 2 week free trial. It is mailed out by the company so the turn around time is not great.     Telma did not answer so a detailed voicemail with this information was left and call back number provided.     Radha HEBERT, RN, [facility]  Office: [phone]  Fax: [phone]    Telma called back. She is understandably frustrated. She would not like to move forward with the free trial of Doptelet or with the letter for Promacta. She would like Dr. Chin to message her directly with a recommendation for a hematologist in [place]. As well as any alternatives to tranexamic acid that she can take while she is waiting to be seen by a new provider.     RN will communicate this to Dr. Chin.     Radha HEBERT, RN, [facility]  Office: [phone]  Fax: [phone]    10/4 Addendum  RN received a phone call from Telma wondering about the prescription and to why there were only 6 days worth of medication prescribed. RN spoke with Dr. Chin. Telma is supposed to take this at the start of her menstrual flow--- and then take for six days. She should NOT take beyond 6 days a month due to potential GI issues. A prescription for 60 tablets was sent to last 3 months. Telma did not answer so a detailed voicemail was left and a call back number provided.       Radha HEBERT, RN, [facility]  Office: [phone]  Fax: [phone]

## 2022-10-04 RX ORDER — MECLOFENAMATE SODIUM 100 MG/1
100 CAPSULE ORAL 3 TIMES DAILY
Qty: 60 CAPSULE | Refills: 1 | Status: SHIPPED | OUTPATIENT
Start: 2022-10-04

## 2022-10-18 ENCOUNTER — TELEPHONE (OUTPATIENT)
Dept: HEMATOLOGY | Facility: CLINIC | Age: 22
End: 2022-10-18

## 2022-10-18 NOTE — TELEPHONE ENCOUNTER
Telma Freed is a 22yr old seen by the [facility] for ITP.    Telma called the nurseline today looking for an update on the letter for Promacta approval as well as the name of the provider she's being referred to in [place].    Staff informed Telma that Dr. Chin will be in tomorrow and nursing staff can get and update to communicate back to Telma.    Telma was agreeable to this plan.    Routing to care team.     Tomeka PEREZN, RN, PHN   [facility]   Office: [phone]  Fax: [phone]

## 2022-10-19 ENCOUNTER — TELEPHONE (OUTPATIENT)
Dept: HEMATOLOGY | Facility: CLINIC | Age: 22
End: 2022-10-19

## 2022-10-19 NOTE — TELEPHONE ENCOUNTER
Progress notes and facesheet faxed on Telma Freed to:    Darell Hernandez MD   [facility]   [facility]     Phone: [phone] Fax: [phone]   ATTN: Pippa    [facility], [address] [place] [address]     Emailed the receiving group to expect the fax.    Patient called shortly after to confirm records had been sent.    Rima DOE, RN   Nurse Clinician  [facility]  Office: [phone]  Main Office: [phone] (ask to speak with a nurse)  Fax: [phone]       Best  for DR. Darell Hernandez:    Pippa Barkley FNP-C, CACP  Nurse Practitioner  Certified Anticoagulation Care Provider        [facility] [address], [address], [place] [address]  Tel [phone] Fax [phone]  [email or web address]     Rima DOE, RN   Nurse Clinician  [facility]  Office: [phone]  Main Office: [phone] (ask to speak with a nurse)  Fax: [phone]

## 2023-06-04 ENCOUNTER — HEALTH MAINTENANCE LETTER (OUTPATIENT)
Age: 23
End: 2023-06-04

## 2024-07-14 ENCOUNTER — HEALTH MAINTENANCE LETTER (OUTPATIENT)
Age: 24
End: 2024-07-14

## 2025-07-19 ENCOUNTER — HEALTH MAINTENANCE LETTER (OUTPATIENT)
Age: 25
End: 2025-07-19